# Patient Record
Sex: MALE | Race: BLACK OR AFRICAN AMERICAN | Employment: OTHER | URBAN - METROPOLITAN AREA
[De-identification: names, ages, dates, MRNs, and addresses within clinical notes are randomized per-mention and may not be internally consistent; named-entity substitution may affect disease eponyms.]

---

## 2017-01-30 ENCOUNTER — TELEPHONE (OUTPATIENT)
Dept: CARDIOLOGY CLINIC | Age: 77
End: 2017-01-30

## 2017-02-22 ENCOUNTER — TELEPHONE (OUTPATIENT)
Dept: CARDIOLOGY CLINIC | Age: 77
End: 2017-02-22

## 2017-03-16 ENCOUNTER — OFFICE VISIT (OUTPATIENT)
Dept: CARDIOLOGY CLINIC | Age: 77
End: 2017-03-16

## 2017-03-16 VITALS
BODY MASS INDEX: 28.47 KG/M2 | DIASTOLIC BLOOD PRESSURE: 96 MMHG | RESPIRATION RATE: 18 BRPM | HEIGHT: 74 IN | HEART RATE: 65 BPM | SYSTOLIC BLOOD PRESSURE: 142 MMHG | WEIGHT: 221.8 LBS

## 2017-03-16 DIAGNOSIS — I48.91 ATRIAL FIBRILLATION, UNSPECIFIED TYPE (HCC): Primary | ICD-10-CM

## 2017-03-16 PROCEDURE — G8427 DOCREV CUR MEDS BY ELIG CLIN: HCPCS | Performed by: INTERNAL MEDICINE

## 2017-03-16 PROCEDURE — G8484 FLU IMMUNIZE NO ADMIN: HCPCS | Performed by: INTERNAL MEDICINE

## 2017-03-16 PROCEDURE — 1036F TOBACCO NON-USER: CPT | Performed by: INTERNAL MEDICINE

## 2017-03-16 PROCEDURE — 1123F ACP DISCUSS/DSCN MKR DOCD: CPT | Performed by: INTERNAL MEDICINE

## 2017-03-16 PROCEDURE — 93000 ELECTROCARDIOGRAM COMPLETE: CPT | Performed by: INTERNAL MEDICINE

## 2017-03-16 PROCEDURE — G8420 CALC BMI NORM PARAMETERS: HCPCS | Performed by: INTERNAL MEDICINE

## 2017-03-16 PROCEDURE — 4040F PNEUMOC VAC/ADMIN/RCVD: CPT | Performed by: INTERNAL MEDICINE

## 2017-03-16 PROCEDURE — 99215 OFFICE O/P EST HI 40 MIN: CPT | Performed by: INTERNAL MEDICINE

## 2017-03-16 RX ORDER — AMLODIPINE BESYLATE 5 MG/1
5 TABLET ORAL DAILY
Qty: 30 TABLET | Refills: 3 | Status: SHIPPED | OUTPATIENT
Start: 2017-03-16 | End: 2017-03-16 | Stop reason: SDUPTHER

## 2017-03-17 RX ORDER — AMLODIPINE BESYLATE 5 MG/1
TABLET ORAL
Qty: 90 TABLET | Refills: 3 | Status: SHIPPED | OUTPATIENT
Start: 2017-03-17 | End: 2017-10-16 | Stop reason: SDUPTHER

## 2017-04-24 ENCOUNTER — TELEPHONE (OUTPATIENT)
Dept: CARDIOLOGY CLINIC | Age: 77
End: 2017-04-24

## 2017-07-31 ENCOUNTER — TELEPHONE (OUTPATIENT)
Dept: CARDIOLOGY CLINIC | Age: 77
End: 2017-07-31

## 2017-08-21 ENCOUNTER — TELEPHONE (OUTPATIENT)
Dept: CARDIOLOGY CLINIC | Age: 77
End: 2017-08-21

## 2017-08-21 DIAGNOSIS — R53.83 FATIGUE, UNSPECIFIED TYPE: ICD-10-CM

## 2017-08-21 DIAGNOSIS — R55 SYNCOPE, UNSPECIFIED SYNCOPE TYPE: Primary | ICD-10-CM

## 2017-08-21 DIAGNOSIS — R06.02 SHORTNESS OF BREATH: ICD-10-CM

## 2017-08-21 DIAGNOSIS — I10 ESSENTIAL HYPERTENSION: ICD-10-CM

## 2017-08-21 DIAGNOSIS — R06.09 DOE (DYSPNEA ON EXERTION): ICD-10-CM

## 2017-08-21 DIAGNOSIS — I48.19 PERSISTENT ATRIAL FIBRILLATION (HCC): ICD-10-CM

## 2017-09-19 ENCOUNTER — OFFICE VISIT (OUTPATIENT)
Dept: CARDIOLOGY CLINIC | Age: 77
End: 2017-09-19

## 2017-09-19 VITALS
SYSTOLIC BLOOD PRESSURE: 108 MMHG | WEIGHT: 206.6 LBS | DIASTOLIC BLOOD PRESSURE: 64 MMHG | BODY MASS INDEX: 26.53 KG/M2 | HEART RATE: 94 BPM

## 2017-09-19 DIAGNOSIS — I48.91 ATRIAL FIBRILLATION, UNSPECIFIED TYPE (HCC): Primary | ICD-10-CM

## 2017-09-19 DIAGNOSIS — I10 ESSENTIAL HYPERTENSION: ICD-10-CM

## 2017-09-19 PROCEDURE — 93000 ELECTROCARDIOGRAM COMPLETE: CPT | Performed by: INTERNAL MEDICINE

## 2017-09-19 PROCEDURE — 99215 OFFICE O/P EST HI 40 MIN: CPT | Performed by: INTERNAL MEDICINE

## 2017-09-19 RX ORDER — METOPROLOL SUCCINATE 200 MG/1
200 TABLET, EXTENDED RELEASE ORAL DAILY
Qty: 30 TABLET | Refills: 3 | Status: SHIPPED | OUTPATIENT
Start: 2017-09-19 | End: 2017-09-19 | Stop reason: SDUPTHER

## 2017-09-20 DIAGNOSIS — I10 ESSENTIAL HYPERTENSION: ICD-10-CM

## 2017-09-20 DIAGNOSIS — I48.91 ATRIAL FIBRILLATION, UNSPECIFIED TYPE (HCC): ICD-10-CM

## 2017-09-20 LAB
ANION GAP SERPL CALCULATED.3IONS-SCNC: 13 MMOL/L (ref 3–16)
BUN BLDV-MCNC: 12 MG/DL (ref 7–20)
CALCIUM SERPL-MCNC: 9.7 MG/DL (ref 8.3–10.6)
CHLORIDE BLD-SCNC: 102 MMOL/L (ref 99–110)
CO2: 27 MMOL/L (ref 21–32)
CREAT SERPL-MCNC: 1.3 MG/DL (ref 0.8–1.3)
GFR AFRICAN AMERICAN: >60
GFR NON-AFRICAN AMERICAN: 53
GLUCOSE BLD-MCNC: 102 MG/DL (ref 70–99)
POTASSIUM SERPL-SCNC: 4.1 MMOL/L (ref 3.5–5.1)
SODIUM BLD-SCNC: 142 MMOL/L (ref 136–145)

## 2017-09-22 ENCOUNTER — TELEPHONE (OUTPATIENT)
Dept: CARDIOLOGY CLINIC | Age: 77
End: 2017-09-22

## 2017-09-26 RX ORDER — METOPROLOL SUCCINATE 200 MG/1
TABLET, EXTENDED RELEASE ORAL
Qty: 90 TABLET | Refills: 3 | Status: SHIPPED | OUTPATIENT
Start: 2017-09-26 | End: 2019-03-22 | Stop reason: SDUPTHER

## 2017-10-16 RX ORDER — AMLODIPINE BESYLATE 5 MG/1
5 TABLET ORAL DAILY
Qty: 90 TABLET | Refills: 5 | Status: ON HOLD | OUTPATIENT
Start: 2017-10-16 | End: 2019-01-30 | Stop reason: HOSPADM

## 2017-11-07 ENCOUNTER — TELEPHONE (OUTPATIENT)
Dept: CARDIOLOGY CLINIC | Age: 77
End: 2017-11-07

## 2018-01-15 ENCOUNTER — TELEPHONE (OUTPATIENT)
Dept: CARDIOLOGY CLINIC | Age: 78
End: 2018-01-15

## 2018-02-14 ENCOUNTER — TELEPHONE (OUTPATIENT)
Dept: CARDIOLOGY CLINIC | Age: 78
End: 2018-02-14

## 2018-03-09 ENCOUNTER — TELEPHONE (OUTPATIENT)
Dept: CARDIOLOGY CLINIC | Age: 78
End: 2018-03-09

## 2018-04-23 ENCOUNTER — OFFICE VISIT (OUTPATIENT)
Dept: CARDIOLOGY CLINIC | Age: 78
End: 2018-04-23

## 2018-04-23 VITALS
SYSTOLIC BLOOD PRESSURE: 118 MMHG | DIASTOLIC BLOOD PRESSURE: 80 MMHG | BODY MASS INDEX: 27.58 KG/M2 | HEART RATE: 88 BPM | WEIGHT: 214.8 LBS

## 2018-04-23 DIAGNOSIS — I48.91 ATRIAL FIBRILLATION, UNSPECIFIED TYPE (HCC): Primary | ICD-10-CM

## 2018-04-23 DIAGNOSIS — R06.09 DOE (DYSPNEA ON EXERTION): ICD-10-CM

## 2018-04-23 DIAGNOSIS — I10 ESSENTIAL HYPERTENSION: ICD-10-CM

## 2018-04-23 PROCEDURE — 93000 ELECTROCARDIOGRAM COMPLETE: CPT | Performed by: INTERNAL MEDICINE

## 2018-04-23 PROCEDURE — 99214 OFFICE O/P EST MOD 30 MIN: CPT | Performed by: INTERNAL MEDICINE

## 2018-04-23 RX ORDER — HYDROCHLOROTHIAZIDE 25 MG/1
TABLET ORAL
Refills: 3 | Status: ON HOLD | COMMUNITY
Start: 2018-02-12 | End: 2019-01-30 | Stop reason: HOSPADM

## 2018-06-29 ENCOUNTER — TELEPHONE (OUTPATIENT)
Dept: CARDIOLOGY CLINIC | Age: 78
End: 2018-06-29

## 2018-06-29 DIAGNOSIS — I51.9 LV DYSFUNCTION: ICD-10-CM

## 2018-06-29 DIAGNOSIS — I48.91 ATRIAL FIBRILLATION BY ELECTROCARDIOGRAM (HCC): Primary | ICD-10-CM

## 2018-06-29 DIAGNOSIS — R06.02 SHORTNESS OF BREATH: ICD-10-CM

## 2018-09-12 NOTE — TELEPHONE ENCOUNTER
Medication: Eliquis     Strength: 5 mg     Directions:  Take one tablet by mouth twice daily     Last visit : 4/23/18    Next Visit : 10/22/18    Last fill: 6/7/17

## 2018-10-09 ENCOUNTER — ANTI-COAG VISIT (OUTPATIENT)
Dept: CARDIOLOGY CLINIC | Age: 78
End: 2018-10-09

## 2018-10-22 ENCOUNTER — TELEPHONE (OUTPATIENT)
Dept: CARDIOLOGY CLINIC | Age: 78
End: 2018-10-22

## 2018-10-22 ENCOUNTER — OFFICE VISIT (OUTPATIENT)
Dept: CARDIOLOGY CLINIC | Age: 78
End: 2018-10-22
Payer: MEDICARE

## 2018-10-22 VITALS
SYSTOLIC BLOOD PRESSURE: 122 MMHG | HEART RATE: 78 BPM | BODY MASS INDEX: 29.15 KG/M2 | DIASTOLIC BLOOD PRESSURE: 76 MMHG | WEIGHT: 227 LBS

## 2018-10-22 DIAGNOSIS — R06.09 DOE (DYSPNEA ON EXERTION): ICD-10-CM

## 2018-10-22 DIAGNOSIS — I10 ESSENTIAL HYPERTENSION: ICD-10-CM

## 2018-10-22 DIAGNOSIS — I48.20 CHRONIC ATRIAL FIBRILLATION (HCC): Primary | ICD-10-CM

## 2018-10-22 PROCEDURE — 99214 OFFICE O/P EST MOD 30 MIN: CPT | Performed by: INTERNAL MEDICINE

## 2018-11-05 ENCOUNTER — NURSE ONLY (OUTPATIENT)
Dept: CARDIOLOGY CLINIC | Age: 78
End: 2018-11-05

## 2018-11-05 NOTE — PROGRESS NOTES
Peer to Peer done with insurance company by Abdelrahman Menard for Stress test. Confirmation code is [id number].

## 2018-11-06 ENCOUNTER — HOSPITAL ENCOUNTER (OUTPATIENT)
Dept: NON INVASIVE DIAGNOSTICS | Age: 78
Discharge: HOME | End: 2018-11-06
Payer: MEDICARE

## 2018-11-06 ENCOUNTER — HOSPITAL ENCOUNTER (OUTPATIENT)
Dept: NUCLEAR MEDICINE | Age: 78
Discharge: HOME | End: 2018-11-06
Payer: MEDICARE

## 2018-11-06 DIAGNOSIS — R06.02 SHORTNESS OF BREATH: ICD-10-CM

## 2018-11-06 DIAGNOSIS — I48.91 ATRIAL FIBRILLATION BY ELECTROCARDIOGRAM (HCC): ICD-10-CM

## 2018-11-06 DIAGNOSIS — I51.9 LV DYSFUNCTION: ICD-10-CM

## 2018-11-06 LAB
LV EF: 65 %
LVEF MODALITY: NORMAL

## 2018-11-06 PROCEDURE — A9502 TC99M TETROFOSMIN: HCPCS | Performed by: NURSE PRACTITIONER

## 2018-11-06 PROCEDURE — 78452 HT MUSCLE IMAGE SPECT MULT: CPT

## 2018-11-06 PROCEDURE — 6360000002 HC RX W HCPCS: Performed by: NURSE PRACTITIONER

## 2018-11-06 PROCEDURE — 3430000000 HC RX DIAGNOSTIC RADIOPHARMACEUTICAL: Performed by: NURSE PRACTITIONER

## 2018-11-06 PROCEDURE — A9502 TC99M TETROFOSMIN: HCPCS | Performed by: INTERNAL MEDICINE

## 2018-11-06 PROCEDURE — 2580000003 HC RX 258: Performed by: NURSE PRACTITIONER

## 2018-11-06 PROCEDURE — 3430000000 HC RX DIAGNOSTIC RADIOPHARMACEUTICAL: Performed by: INTERNAL MEDICINE

## 2018-11-06 PROCEDURE — 93017 CV STRESS TEST TRACING ONLY: CPT

## 2018-11-06 RX ORDER — 0.9 % SODIUM CHLORIDE 0.9 %
10 VIAL (ML) INJECTION PRN
Status: DISCONTINUED | OUTPATIENT
Start: 2018-11-06 | End: 2018-11-07 | Stop reason: HOSPADM

## 2018-11-06 RX ADMIN — REGADENOSON 0.4 MG: 0.08 INJECTION, SOLUTION INTRAVENOUS at 11:33

## 2018-11-06 RX ADMIN — TETROFOSMIN 30 MILLICURIE: 1.38 INJECTION, POWDER, LYOPHILIZED, FOR SOLUTION INTRAVENOUS at 11:33

## 2018-11-06 RX ADMIN — SODIUM CHLORIDE 10 ML: 9 INJECTION, SOLUTION INTRAMUSCULAR; INTRAVENOUS; SUBCUTANEOUS at 09:51

## 2018-11-06 RX ADMIN — SODIUM CHLORIDE 10 ML: 9 INJECTION, SOLUTION INTRAMUSCULAR; INTRAVENOUS; SUBCUTANEOUS at 11:34

## 2018-11-06 RX ADMIN — TETROFOSMIN 10 MILLICURIE: 1.38 INJECTION, POWDER, LYOPHILIZED, FOR SOLUTION INTRAVENOUS at 09:50

## 2018-11-07 ENCOUNTER — NURSE ONLY (OUTPATIENT)
Dept: CARDIOLOGY CLINIC | Age: 78
End: 2018-11-07

## 2018-11-07 DIAGNOSIS — R94.39 ABNORMAL STRESS TEST: Primary | ICD-10-CM

## 2018-11-08 ENCOUNTER — TELEPHONE (OUTPATIENT)
Dept: CARDIOLOGY CLINIC | Age: 78
End: 2018-11-08

## 2018-11-08 DIAGNOSIS — E78.00 PURE HYPERCHOLESTEROLEMIA: Primary | ICD-10-CM

## 2018-11-12 NOTE — TELEPHONE ENCOUNTER
Called and spoke with Mr. Rossi ,   He denies cp or fatigue. He has SOB with exertion (long distances) which he states is chronic and unchanged. He is on maximal doses of metoprolol and lisinopril. He takes eliquis (not on aspirin). His cholesterol hasn't been checked recently and his lipitor is 10 mg. I will mail an order for Lipid panel. I discussed coronary angiogram again and he still doesn't want it. He will reconsider if he has symptoms.      Ignacio Middleton NP

## 2019-01-11 ENCOUNTER — TELEPHONE (OUTPATIENT)
Dept: CARDIOLOGY CLINIC | Age: 79
End: 2019-01-11

## 2019-01-11 NOTE — TELEPHONE ENCOUNTER
Patient would like to discuss angiogram pt was scheduled and didn't make it to the appointment  Pt wants  to see if he still needs to have the test done since he had the stress test 11/13/18

## 2019-01-15 ENCOUNTER — TELEPHONE (OUTPATIENT)
Dept: CARDIOLOGY CLINIC | Age: 79
End: 2019-01-15

## 2019-01-16 ENCOUNTER — NURSE ONLY (OUTPATIENT)
Dept: CARDIOLOGY CLINIC | Age: 79
End: 2019-01-16

## 2019-01-29 ENCOUNTER — HOSPITAL ENCOUNTER (OUTPATIENT)
Dept: CARDIAC CATH/INVASIVE PROCEDURES | Age: 79
Setting detail: OBSERVATION
Discharge: HOME | End: 2019-01-30
Attending: INTERNAL MEDICINE | Admitting: INTERNAL MEDICINE
Payer: MEDICARE

## 2019-01-29 PROBLEM — I25.10 CAD IN NATIVE ARTERY: Status: ACTIVE | Noted: 2019-01-29

## 2019-01-29 LAB
A/G RATIO: 1 (ref 1.1–2.2)
ALBUMIN SERPL-MCNC: 3.8 G/DL (ref 3.4–5)
ALP BLD-CCNC: 80 U/L (ref 40–129)
ALT SERPL-CCNC: 10 U/L (ref 10–40)
ANION GAP SERPL CALCULATED.3IONS-SCNC: 12 MMOL/L (ref 3–16)
AST SERPL-CCNC: 17 U/L (ref 15–37)
BILIRUB SERPL-MCNC: 0.5 MG/DL (ref 0–1)
BUN BLDV-MCNC: 18 MG/DL (ref 7–20)
CALCIUM SERPL-MCNC: 9.1 MG/DL (ref 8.3–10.6)
CHLORIDE BLD-SCNC: 103 MMOL/L (ref 99–110)
CO2: 26 MMOL/L (ref 21–32)
CREAT SERPL-MCNC: 1.4 MG/DL (ref 0.8–1.3)
GFR AFRICAN AMERICAN: 59
GFR NON-AFRICAN AMERICAN: 49
GLOBULIN: 3.8 G/DL
GLUCOSE BLD-MCNC: 117 MG/DL (ref 70–99)
HCT VFR BLD CALC: 37.7 % (ref 40.5–52.5)
HEMOGLOBIN: 12.3 G/DL (ref 13.5–17.5)
INR BLD: 1.15 (ref 0.86–1.14)
MCH RBC QN AUTO: 29.9 PG (ref 26–34)
MCHC RBC AUTO-ENTMCNC: 32.7 G/DL (ref 31–36)
MCV RBC AUTO: 91.5 FL (ref 80–100)
PDW BLD-RTO: 13.9 % (ref 12.4–15.4)
PLATELET # BLD: 231 K/UL (ref 135–450)
PMV BLD AUTO: 8.9 FL (ref 5–10.5)
POC ACT LR: 361 SEC (ref 113–149)
POC ACT LR: >400 SEC (ref 113–149)
POTASSIUM SERPL-SCNC: 3.5 MMOL/L (ref 3.5–5.1)
PROTHROMBIN TIME: 13.1 SEC (ref 9.8–13)
RBC # BLD: 4.12 M/UL (ref 4.2–5.9)
SODIUM BLD-SCNC: 141 MMOL/L (ref 136–145)
TOTAL PROTEIN: 7.6 G/DL (ref 6.4–8.2)
WBC # BLD: 5.6 K/UL (ref 4–11)

## 2019-01-29 PROCEDURE — 2709999900 HC NON-CHARGEABLE SUPPLY

## 2019-01-29 PROCEDURE — C1874 STENT, COATED/COV W/DEL SYS: HCPCS

## 2019-01-29 PROCEDURE — 85610 PROTHROMBIN TIME: CPT

## 2019-01-29 PROCEDURE — 2500000003 HC RX 250 WO HCPCS

## 2019-01-29 PROCEDURE — 85027 COMPLETE CBC AUTOMATED: CPT

## 2019-01-29 PROCEDURE — 99152 MOD SED SAME PHYS/QHP 5/>YRS: CPT | Performed by: INTERNAL MEDICINE

## 2019-01-29 PROCEDURE — 92928 PRQ TCAT PLMT NTRAC ST 1 LES: CPT | Performed by: INTERNAL MEDICINE

## 2019-01-29 PROCEDURE — 92928 PRQ TCAT PLMT NTRAC ST 1 LES: CPT

## 2019-01-29 PROCEDURE — 36556 INSERT NON-TUNNEL CV CATH: CPT | Performed by: INTERNAL MEDICINE

## 2019-01-29 PROCEDURE — C1769 GUIDE WIRE: HCPCS

## 2019-01-29 PROCEDURE — C1894 INTRO/SHEATH, NON-LASER: HCPCS

## 2019-01-29 PROCEDURE — 93458 L HRT ARTERY/VENTRICLE ANGIO: CPT | Performed by: INTERNAL MEDICINE

## 2019-01-29 PROCEDURE — 93308 TTE F-UP OR LMTD: CPT

## 2019-01-29 PROCEDURE — 99152 MOD SED SAME PHYS/QHP 5/>YRS: CPT

## 2019-01-29 PROCEDURE — APPSS30 APP SPLIT SHARED TIME 16-30 MINUTES: Performed by: NURSE PRACTITIONER

## 2019-01-29 PROCEDURE — C1887 CATHETER, GUIDING: HCPCS

## 2019-01-29 PROCEDURE — 93458 L HRT ARTERY/VENTRICLE ANGIO: CPT

## 2019-01-29 PROCEDURE — 85347 COAGULATION TIME ACTIVATED: CPT

## 2019-01-29 PROCEDURE — 93005 ELECTROCARDIOGRAM TRACING: CPT | Performed by: INTERNAL MEDICINE

## 2019-01-29 PROCEDURE — 2580000003 HC RX 258: Performed by: INTERNAL MEDICINE

## 2019-01-29 PROCEDURE — 80053 COMPREHEN METABOLIC PANEL: CPT

## 2019-01-29 PROCEDURE — 6360000004 HC RX CONTRAST MEDICATION: Performed by: INTERNAL MEDICINE

## 2019-01-29 PROCEDURE — 99153 MOD SED SAME PHYS/QHP EA: CPT

## 2019-01-29 PROCEDURE — G0378 HOSPITAL OBSERVATION PER HR: HCPCS

## 2019-01-29 RX ORDER — ASPIRIN 325 MG
325 TABLET ORAL ONCE
Status: DISCONTINUED | OUTPATIENT
Start: 2019-01-29 | End: 2019-01-29

## 2019-01-29 RX ORDER — SODIUM CHLORIDE 9 MG/ML
INJECTION, SOLUTION INTRAVENOUS CONTINUOUS
Status: DISCONTINUED | OUTPATIENT
Start: 2019-01-29 | End: 2019-01-30 | Stop reason: HOSPADM

## 2019-01-29 RX ORDER — SODIUM CHLORIDE 0.9 % (FLUSH) 0.9 %
10 SYRINGE (ML) INJECTION EVERY 12 HOURS SCHEDULED
Status: DISCONTINUED | OUTPATIENT
Start: 2019-01-29 | End: 2019-01-29 | Stop reason: SDUPTHER

## 2019-01-29 RX ORDER — ATORVASTATIN CALCIUM 40 MG/1
40 TABLET, FILM COATED ORAL DAILY
Status: DISCONTINUED | OUTPATIENT
Start: 2019-01-30 | End: 2019-01-30 | Stop reason: HOSPADM

## 2019-01-29 RX ORDER — METOPROLOL SUCCINATE 100 MG/1
100 TABLET, EXTENDED RELEASE ORAL DAILY
Status: DISCONTINUED | OUTPATIENT
Start: 2019-01-30 | End: 2019-01-30 | Stop reason: HOSPADM

## 2019-01-29 RX ORDER — SODIUM CHLORIDE 0.9 % (FLUSH) 0.9 %
10 SYRINGE (ML) INJECTION EVERY 12 HOURS SCHEDULED
Status: DISCONTINUED | OUTPATIENT
Start: 2019-01-29 | End: 2019-01-30 | Stop reason: HOSPADM

## 2019-01-29 RX ORDER — ASPIRIN 81 MG/1
81 TABLET, CHEWABLE ORAL DAILY
Status: DISCONTINUED | OUTPATIENT
Start: 2019-01-30 | End: 2019-01-30 | Stop reason: HOSPADM

## 2019-01-29 RX ORDER — SODIUM CHLORIDE 0.9 % (FLUSH) 0.9 %
10 SYRINGE (ML) INJECTION PRN
Status: DISCONTINUED | OUTPATIENT
Start: 2019-01-29 | End: 2019-01-29 | Stop reason: SDUPTHER

## 2019-01-29 RX ORDER — ACETAMINOPHEN 325 MG/1
650 TABLET ORAL EVERY 4 HOURS PRN
Status: DISCONTINUED | OUTPATIENT
Start: 2019-01-29 | End: 2019-01-30 | Stop reason: HOSPADM

## 2019-01-29 RX ORDER — SODIUM CHLORIDE 0.9 % (FLUSH) 0.9 %
10 SYRINGE (ML) INJECTION PRN
Status: DISCONTINUED | OUTPATIENT
Start: 2019-01-29 | End: 2019-01-30 | Stop reason: HOSPADM

## 2019-01-29 RX ORDER — CLOPIDOGREL BISULFATE 75 MG/1
75 TABLET ORAL DAILY
Status: DISCONTINUED | OUTPATIENT
Start: 2019-01-30 | End: 2019-01-30 | Stop reason: HOSPADM

## 2019-01-29 RX ADMIN — IOHEXOL 195 ML: 350 INJECTION, SOLUTION INTRAVENOUS at 11:32

## 2019-01-29 RX ADMIN — Medication 10 ML: at 20:06

## 2019-01-29 RX ADMIN — SODIUM CHLORIDE: 9 INJECTION, SOLUTION INTRAVENOUS at 20:04

## 2019-01-29 ASSESSMENT — PAIN SCALES - GENERAL: PAINLEVEL_OUTOF10: 0

## 2019-01-29 ASSESSMENT — PAIN - FUNCTIONAL ASSESSMENT: PAIN_FUNCTIONAL_ASSESSMENT: 0-10

## 2019-01-30 VITALS
SYSTOLIC BLOOD PRESSURE: 131 MMHG | WEIGHT: 220 LBS | HEIGHT: 74 IN | TEMPERATURE: 97.6 F | HEART RATE: 85 BPM | OXYGEN SATURATION: 98 % | DIASTOLIC BLOOD PRESSURE: 82 MMHG | BODY MASS INDEX: 28.23 KG/M2 | RESPIRATION RATE: 18 BRPM

## 2019-01-30 LAB
ANION GAP SERPL CALCULATED.3IONS-SCNC: 9 MMOL/L (ref 3–16)
BUN BLDV-MCNC: 14 MG/DL (ref 7–20)
CALCIUM SERPL-MCNC: 8.6 MG/DL (ref 8.3–10.6)
CHLORIDE BLD-SCNC: 105 MMOL/L (ref 99–110)
CHOLESTEROL, TOTAL: 125 MG/DL (ref 0–199)
CO2: 26 MMOL/L (ref 21–32)
CREAT SERPL-MCNC: 1.2 MG/DL (ref 0.8–1.3)
EKG ATRIAL RATE: 208 BPM
EKG ATRIAL RATE: 394 BPM
EKG DIAGNOSIS: NORMAL
EKG DIAGNOSIS: NORMAL
EKG Q-T INTERVAL: 360 MS
EKG Q-T INTERVAL: 370 MS
EKG QRS DURATION: 80 MS
EKG QRS DURATION: 82 MS
EKG QTC CALCULATION (BAZETT): 447 MS
EKG QTC CALCULATION (BAZETT): 447 MS
EKG R AXIS: 23 DEGREES
EKG R AXIS: 32 DEGREES
EKG T AXIS: 20 DEGREES
EKG T AXIS: 32 DEGREES
EKG VENTRICULAR RATE: 88 BPM
EKG VENTRICULAR RATE: 93 BPM
GFR AFRICAN AMERICAN: >60
GFR NON-AFRICAN AMERICAN: 58
GLUCOSE BLD-MCNC: 126 MG/DL (ref 70–99)
HCT VFR BLD CALC: 34.7 % (ref 40.5–52.5)
HDLC SERPL-MCNC: 41 MG/DL (ref 40–60)
HEMOGLOBIN: 11.2 G/DL (ref 13.5–17.5)
LDL CHOLESTEROL CALCULATED: 73 MG/DL
MAGNESIUM: 2 MG/DL (ref 1.8–2.4)
MCH RBC QN AUTO: 30 PG (ref 26–34)
MCHC RBC AUTO-ENTMCNC: 32.3 G/DL (ref 31–36)
MCV RBC AUTO: 92.8 FL (ref 80–100)
PDW BLD-RTO: 14 % (ref 12.4–15.4)
PLATELET # BLD: 197 K/UL (ref 135–450)
PMV BLD AUTO: 9.2 FL (ref 5–10.5)
POTASSIUM SERPL-SCNC: 4 MMOL/L (ref 3.5–5.1)
RBC # BLD: 3.74 M/UL (ref 4.2–5.9)
SODIUM BLD-SCNC: 140 MMOL/L (ref 136–145)
TRIGL SERPL-MCNC: 54 MG/DL (ref 0–150)
VLDLC SERPL CALC-MCNC: 11 MG/DL
WBC # BLD: 6.5 K/UL (ref 4–11)

## 2019-01-30 PROCEDURE — 93010 ELECTROCARDIOGRAM REPORT: CPT | Performed by: INTERNAL MEDICINE

## 2019-01-30 PROCEDURE — 6370000000 HC RX 637 (ALT 250 FOR IP): Performed by: NURSE PRACTITIONER

## 2019-01-30 PROCEDURE — 93005 ELECTROCARDIOGRAM TRACING: CPT | Performed by: NURSE PRACTITIONER

## 2019-01-30 PROCEDURE — G0378 HOSPITAL OBSERVATION PER HR: HCPCS

## 2019-01-30 PROCEDURE — 6370000000 HC RX 637 (ALT 250 FOR IP): Performed by: INTERNAL MEDICINE

## 2019-01-30 PROCEDURE — 83735 ASSAY OF MAGNESIUM: CPT

## 2019-01-30 PROCEDURE — 80061 LIPID PANEL: CPT

## 2019-01-30 PROCEDURE — 80048 BASIC METABOLIC PNL TOTAL CA: CPT

## 2019-01-30 PROCEDURE — G0238 OTH RESP PROC, INDIV: HCPCS

## 2019-01-30 PROCEDURE — 99217 PR OBSERVATION CARE DISCHARGE MANAGEMENT: CPT | Performed by: NURSE PRACTITIONER

## 2019-01-30 PROCEDURE — 85027 COMPLETE CBC AUTOMATED: CPT

## 2019-01-30 RX ORDER — PANTOPRAZOLE SODIUM 40 MG/1
40 TABLET, DELAYED RELEASE ORAL
Status: DISCONTINUED | OUTPATIENT
Start: 2019-01-30 | End: 2019-01-30 | Stop reason: HOSPADM

## 2019-01-30 RX ORDER — LISINOPRIL 10 MG/1
10 TABLET ORAL DAILY
Status: DISCONTINUED | OUTPATIENT
Start: 2019-01-30 | End: 2019-01-30 | Stop reason: HOSPADM

## 2019-01-30 RX ORDER — ATORVASTATIN CALCIUM 40 MG/1
40 TABLET, FILM COATED ORAL DAILY
Qty: 30 TABLET | Refills: 0 | Status: SHIPPED | OUTPATIENT
Start: 2019-01-31 | End: 2019-03-11 | Stop reason: SDUPTHER

## 2019-01-30 RX ORDER — LISINOPRIL 10 MG/1
10 TABLET ORAL DAILY
Qty: 30 TABLET | Refills: 0 | Status: SHIPPED | OUTPATIENT
Start: 2019-01-30 | End: 2019-02-04 | Stop reason: SDUPTHER

## 2019-01-30 RX ORDER — METOPROLOL SUCCINATE 100 MG/1
100 TABLET, EXTENDED RELEASE ORAL ONCE
Status: COMPLETED | OUTPATIENT
Start: 2019-01-30 | End: 2019-01-30

## 2019-01-30 RX ORDER — CLOPIDOGREL BISULFATE 75 MG/1
75 TABLET ORAL DAILY
Qty: 30 TABLET | Refills: 0 | Status: SHIPPED | OUTPATIENT
Start: 2019-01-31 | End: 2019-03-04

## 2019-01-30 RX ORDER — PANTOPRAZOLE SODIUM 40 MG/1
40 TABLET, DELAYED RELEASE ORAL
Qty: 30 TABLET | Refills: 0 | Status: SHIPPED | OUTPATIENT
Start: 2019-01-30 | End: 2019-03-11 | Stop reason: SDUPTHER

## 2019-01-30 RX ORDER — ASPIRIN 81 MG/1
81 TABLET, CHEWABLE ORAL DAILY
Qty: 30 TABLET | Refills: 0 | Status: SHIPPED | OUTPATIENT
Start: 2019-01-31 | End: 2019-03-11 | Stop reason: SDUPTHER

## 2019-01-30 RX ADMIN — CLOPIDOGREL BISULFATE 75 MG: 75 TABLET ORAL at 08:47

## 2019-01-30 RX ADMIN — LISINOPRIL 10 MG: 10 TABLET ORAL at 10:29

## 2019-01-30 RX ADMIN — ATORVASTATIN CALCIUM 40 MG: 40 TABLET, FILM COATED ORAL at 08:47

## 2019-01-30 RX ADMIN — METOPROLOL SUCCINATE 100 MG: 100 TABLET, EXTENDED RELEASE ORAL at 08:47

## 2019-01-30 RX ADMIN — ASPIRIN 81 MG 81 MG: 81 TABLET ORAL at 08:47

## 2019-01-30 RX ADMIN — METOPROLOL SUCCINATE 100 MG: 100 TABLET, EXTENDED RELEASE ORAL at 10:29

## 2019-01-30 ASSESSMENT — PAIN SCALES - GENERAL
PAINLEVEL_OUTOF10: 0
PAINLEVEL_OUTOF10: 0

## 2019-01-31 LAB
POC ACT LR: 186 SEC (ref 113–149)
POC ACT LR: 186 SEC (ref 113–149)
POC ACT LR: 217 SEC (ref 113–149)
POC ACT LR: 256 SEC (ref 113–149)

## 2019-02-04 ENCOUNTER — OFFICE VISIT (OUTPATIENT)
Dept: CARDIOLOGY CLINIC | Age: 79
End: 2019-02-04
Payer: MEDICARE

## 2019-02-04 VITALS
DIASTOLIC BLOOD PRESSURE: 90 MMHG | HEART RATE: 86 BPM | SYSTOLIC BLOOD PRESSURE: 138 MMHG | WEIGHT: 228.2 LBS | BODY MASS INDEX: 29.3 KG/M2

## 2019-02-04 DIAGNOSIS — E78.00 PURE HYPERCHOLESTEROLEMIA: ICD-10-CM

## 2019-02-04 DIAGNOSIS — I48.19 PERSISTENT ATRIAL FIBRILLATION (HCC): ICD-10-CM

## 2019-02-04 DIAGNOSIS — I25.10 CORONARY ARTERY DISEASE INVOLVING NATIVE CORONARY ARTERY OF NATIVE HEART WITHOUT ANGINA PECTORIS: Primary | ICD-10-CM

## 2019-02-04 DIAGNOSIS — I10 ESSENTIAL HYPERTENSION: ICD-10-CM

## 2019-02-04 PROCEDURE — 99214 OFFICE O/P EST MOD 30 MIN: CPT | Performed by: NURSE PRACTITIONER

## 2019-02-04 RX ORDER — LISINOPRIL 20 MG/1
20 TABLET ORAL DAILY
Qty: 90 TABLET | Refills: 3
Start: 2019-02-04 | End: 2019-02-19 | Stop reason: SDUPTHER

## 2019-02-08 ENCOUNTER — HOSPITAL ENCOUNTER (INPATIENT)
Age: 79
LOS: 4 days | Discharge: HOME | DRG: 813 | End: 2019-02-12
Attending: EMERGENCY MEDICINE | Admitting: INTERNAL MEDICINE
Payer: MEDICARE

## 2019-02-08 ENCOUNTER — APPOINTMENT (OUTPATIENT)
Dept: GENERAL RADIOLOGY | Age: 79
DRG: 813 | End: 2019-02-08
Payer: MEDICARE

## 2019-02-08 DIAGNOSIS — E55.9 VITAMIN D DEFICIENCY: ICD-10-CM

## 2019-02-08 DIAGNOSIS — K92.1 GASTROINTESTINAL HEMORRHAGE WITH MELENA: Primary | ICD-10-CM

## 2019-02-08 PROBLEM — K92.2 UPPER GI BLEED: Status: ACTIVE | Noted: 2019-02-08

## 2019-02-08 LAB
ABO/RH: NORMAL
ALBUMIN SERPL-MCNC: 3.4 G/DL (ref 3.4–5)
ANION GAP SERPL CALCULATED.3IONS-SCNC: 10 MMOL/L (ref 3–16)
ANTIBODY SCREEN: NORMAL
BASOPHILS ABSOLUTE: 0 K/UL (ref 0–0.2)
BASOPHILS RELATIVE PERCENT: 0.1 %
BLOOD BANK DISPENSE STATUS: NORMAL
BLOOD BANK DISPENSE STATUS: NORMAL
BLOOD BANK PRODUCT CODE: NORMAL
BLOOD BANK PRODUCT CODE: NORMAL
BPU ID: NORMAL
BPU ID: NORMAL
BUN BLDV-MCNC: 37 MG/DL (ref 7–20)
CALCIUM SERPL-MCNC: 8.4 MG/DL (ref 8.3–10.6)
CHLORIDE BLD-SCNC: 112 MMOL/L (ref 99–110)
CO2: 22 MMOL/L (ref 21–32)
CREAT SERPL-MCNC: 1.1 MG/DL (ref 0.8–1.3)
DESCRIPTION BLOOD BANK: NORMAL
DESCRIPTION BLOOD BANK: NORMAL
EKG ATRIAL RATE: 94 BPM
EKG DIAGNOSIS: NORMAL
EKG Q-T INTERVAL: 392 MS
EKG QRS DURATION: 82 MS
EKG QTC CALCULATION (BAZETT): 487 MS
EKG R AXIS: 46 DEGREES
EKG T AXIS: 42 DEGREES
EKG VENTRICULAR RATE: 93 BPM
EOSINOPHILS ABSOLUTE: 0 K/UL (ref 0–0.6)
EOSINOPHILS RELATIVE PERCENT: 0.2 %
GFR AFRICAN AMERICAN: >60
GFR NON-AFRICAN AMERICAN: >60
GLUCOSE BLD-MCNC: 111 MG/DL (ref 70–99)
HCT VFR BLD CALC: 20.5 % (ref 40.5–52.5)
HCT VFR BLD CALC: 22.2 % (ref 40.5–52.5)
HEMOGLOBIN: 6.7 G/DL (ref 13.5–17.5)
HEMOGLOBIN: 7.2 G/DL (ref 13.5–17.5)
INR BLD: 1.39 (ref 0.86–1.14)
LYMPHOCYTES ABSOLUTE: 0.4 K/UL (ref 1–5.1)
LYMPHOCYTES RELATIVE PERCENT: 3.9 %
MAGNESIUM: 2 MG/DL (ref 1.8–2.4)
MCH RBC QN AUTO: 30.8 PG (ref 26–34)
MCHC RBC AUTO-ENTMCNC: 32.6 G/DL (ref 31–36)
MCV RBC AUTO: 94.4 FL (ref 80–100)
MONOCYTES ABSOLUTE: 0.5 K/UL (ref 0–1.3)
MONOCYTES RELATIVE PERCENT: 4.9 %
NEUTROPHILS ABSOLUTE: 9.5 K/UL (ref 1.7–7.7)
NEUTROPHILS RELATIVE PERCENT: 90.9 %
PDW BLD-RTO: 14.3 % (ref 12.4–15.4)
PHOSPHORUS: 2 MG/DL (ref 2.5–4.9)
PLATELET # BLD: 188 K/UL (ref 135–450)
PMV BLD AUTO: 9 FL (ref 5–10.5)
POC OCCULT BLOOD STOOL: POSITIVE
POTASSIUM SERPL-SCNC: 4.2 MMOL/L (ref 3.5–5.1)
PROTHROMBIN TIME: 15.8 SEC (ref 9.8–13)
RBC # BLD: 2.18 M/UL (ref 4.2–5.9)
SODIUM BLD-SCNC: 144 MMOL/L (ref 136–145)
TROPONIN: <0.01 NG/ML
WBC # BLD: 10.4 K/UL (ref 4–11)

## 2019-02-08 PROCEDURE — 2580000003 HC RX 258: Performed by: EMERGENCY MEDICINE

## 2019-02-08 PROCEDURE — 71046 X-RAY EXAM CHEST 2 VIEWS: CPT

## 2019-02-08 PROCEDURE — 99223 1ST HOSP IP/OBS HIGH 75: CPT | Performed by: INTERNAL MEDICINE

## 2019-02-08 PROCEDURE — 85018 HEMOGLOBIN: CPT

## 2019-02-08 PROCEDURE — 85014 HEMATOCRIT: CPT

## 2019-02-08 PROCEDURE — 36415 COLL VENOUS BLD VENIPUNCTURE: CPT

## 2019-02-08 PROCEDURE — 80069 RENAL FUNCTION PANEL: CPT

## 2019-02-08 PROCEDURE — 2580000003 HC RX 258: Performed by: STUDENT IN AN ORGANIZED HEALTH CARE EDUCATION/TRAINING PROGRAM

## 2019-02-08 PROCEDURE — 86901 BLOOD TYPING SEROLOGIC RH(D): CPT

## 2019-02-08 PROCEDURE — 84484 ASSAY OF TROPONIN QUANT: CPT

## 2019-02-08 PROCEDURE — 85025 COMPLETE CBC W/AUTO DIFF WBC: CPT

## 2019-02-08 PROCEDURE — 6360000002 HC RX W HCPCS: Performed by: STUDENT IN AN ORGANIZED HEALTH CARE EDUCATION/TRAINING PROGRAM

## 2019-02-08 PROCEDURE — 94761 N-INVAS EAR/PLS OXIMETRY MLT: CPT

## 2019-02-08 PROCEDURE — 93005 ELECTROCARDIOGRAM TRACING: CPT | Performed by: EMERGENCY MEDICINE

## 2019-02-08 PROCEDURE — C9113 INJ PANTOPRAZOLE SODIUM, VIA: HCPCS | Performed by: STUDENT IN AN ORGANIZED HEALTH CARE EDUCATION/TRAINING PROGRAM

## 2019-02-08 PROCEDURE — 6360000002 HC RX W HCPCS: Performed by: EMERGENCY MEDICINE

## 2019-02-08 PROCEDURE — 96361 HYDRATE IV INFUSION ADD-ON: CPT

## 2019-02-08 PROCEDURE — P9016 RBC LEUKOCYTES REDUCED: HCPCS

## 2019-02-08 PROCEDURE — 82272 OCCULT BLD FECES 1-3 TESTS: CPT

## 2019-02-08 PROCEDURE — 86923 COMPATIBILITY TEST ELECTRIC: CPT

## 2019-02-08 PROCEDURE — C9113 INJ PANTOPRAZOLE SODIUM, VIA: HCPCS | Performed by: EMERGENCY MEDICINE

## 2019-02-08 PROCEDURE — 86850 RBC ANTIBODY SCREEN: CPT

## 2019-02-08 PROCEDURE — 99291 CRITICAL CARE FIRST HOUR: CPT | Performed by: INTERNAL MEDICINE

## 2019-02-08 PROCEDURE — 83735 ASSAY OF MAGNESIUM: CPT

## 2019-02-08 PROCEDURE — 85610 PROTHROMBIN TIME: CPT

## 2019-02-08 PROCEDURE — 6370000000 HC RX 637 (ALT 250 FOR IP): Performed by: STUDENT IN AN ORGANIZED HEALTH CARE EDUCATION/TRAINING PROGRAM

## 2019-02-08 PROCEDURE — 99285 EMERGENCY DEPT VISIT HI MDM: CPT

## 2019-02-08 PROCEDURE — 96374 THER/PROPH/DIAG INJ IV PUSH: CPT

## 2019-02-08 PROCEDURE — 36430 TRANSFUSION BLD/BLD COMPNT: CPT

## 2019-02-08 PROCEDURE — 86900 BLOOD TYPING SEROLOGIC ABO: CPT

## 2019-02-08 PROCEDURE — 2000000000 HC ICU R&B

## 2019-02-08 RX ORDER — ATORVASTATIN CALCIUM 40 MG/1
40 TABLET, FILM COATED ORAL DAILY
Status: DISCONTINUED | OUTPATIENT
Start: 2019-02-08 | End: 2019-02-12 | Stop reason: HOSPADM

## 2019-02-08 RX ORDER — SODIUM CHLORIDE 0.9 % (FLUSH) 0.9 %
10 SYRINGE (ML) INJECTION PRN
Status: DISCONTINUED | OUTPATIENT
Start: 2019-02-08 | End: 2019-02-12 | Stop reason: HOSPADM

## 2019-02-08 RX ORDER — ACETAMINOPHEN 325 MG/1
650 TABLET ORAL EVERY 4 HOURS PRN
Status: DISCONTINUED | OUTPATIENT
Start: 2019-02-08 | End: 2019-02-12 | Stop reason: HOSPADM

## 2019-02-08 RX ORDER — 0.9 % SODIUM CHLORIDE 0.9 %
250 INTRAVENOUS SOLUTION INTRAVENOUS ONCE
Status: COMPLETED | OUTPATIENT
Start: 2019-02-08 | End: 2019-02-09

## 2019-02-08 RX ORDER — 0.9 % SODIUM CHLORIDE 0.9 %
1000 INTRAVENOUS SOLUTION INTRAVENOUS ONCE
Status: COMPLETED | OUTPATIENT
Start: 2019-02-08 | End: 2019-02-08

## 2019-02-08 RX ORDER — SODIUM CHLORIDE 9 MG/ML
INJECTION, SOLUTION INTRAVENOUS CONTINUOUS
Status: DISCONTINUED | OUTPATIENT
Start: 2019-02-08 | End: 2019-02-09

## 2019-02-08 RX ORDER — CLOPIDOGREL BISULFATE 75 MG/1
75 TABLET ORAL DAILY
Status: DISCONTINUED | OUTPATIENT
Start: 2019-02-08 | End: 2019-02-08

## 2019-02-08 RX ORDER — ONDANSETRON 2 MG/ML
4 INJECTION INTRAMUSCULAR; INTRAVENOUS EVERY 6 HOURS PRN
Status: DISCONTINUED | OUTPATIENT
Start: 2019-02-08 | End: 2019-02-12 | Stop reason: HOSPADM

## 2019-02-08 RX ORDER — SODIUM CHLORIDE 0.9 % (FLUSH) 0.9 %
10 SYRINGE (ML) INJECTION EVERY 12 HOURS SCHEDULED
Status: DISCONTINUED | OUTPATIENT
Start: 2019-02-08 | End: 2019-02-12 | Stop reason: HOSPADM

## 2019-02-08 RX ORDER — PANTOPRAZOLE SODIUM 40 MG/10ML
80 INJECTION, POWDER, LYOPHILIZED, FOR SOLUTION INTRAVENOUS ONCE
Status: COMPLETED | OUTPATIENT
Start: 2019-02-08 | End: 2019-02-08

## 2019-02-08 RX ORDER — ASPIRIN 81 MG/1
81 TABLET, CHEWABLE ORAL DAILY
Status: DISCONTINUED | OUTPATIENT
Start: 2019-02-08 | End: 2019-02-08

## 2019-02-08 RX ADMIN — ATORVASTATIN CALCIUM 40 MG: 40 TABLET, FILM COATED ORAL at 18:51

## 2019-02-08 RX ADMIN — SODIUM CHLORIDE 250 ML: 9 INJECTION, SOLUTION INTRAVENOUS at 14:57

## 2019-02-08 RX ADMIN — SODIUM CHLORIDE: 9 INJECTION, SOLUTION INTRAVENOUS at 18:50

## 2019-02-08 RX ADMIN — SODIUM CHLORIDE 250 ML: 9 INJECTION, SOLUTION INTRAVENOUS at 18:51

## 2019-02-08 RX ADMIN — Medication 10 ML: at 22:04

## 2019-02-08 RX ADMIN — SODIUM CHLORIDE 8 MG/HR: 900 INJECTION, SOLUTION INTRAVENOUS at 18:50

## 2019-02-08 RX ADMIN — SODIUM CHLORIDE 1000 ML: 9 INJECTION, SOLUTION INTRAVENOUS at 13:24

## 2019-02-08 RX ADMIN — PANTOPRAZOLE SODIUM 80 MG: 40 INJECTION, POWDER, FOR SOLUTION INTRAVENOUS at 14:57

## 2019-02-08 ASSESSMENT — ENCOUNTER SYMPTOMS
SHORTNESS OF BREATH: 0
RHINORRHEA: 0
ABDOMINAL DISTENTION: 0
WHEEZING: 0
DIARRHEA: 0
VOMITING: 0
ABDOMINAL PAIN: 0
SORE THROAT: 0
CHOKING: 0
VOICE CHANGE: 0
CHEST TIGHTNESS: 0
NAUSEA: 1
CONSTIPATION: 1
STRIDOR: 0
RECTAL PAIN: 0
COUGH: 0
SINUS PAIN: 0

## 2019-02-08 ASSESSMENT — PAIN SCALES - GENERAL
PAINLEVEL_OUTOF10: 0

## 2019-02-09 ENCOUNTER — APPOINTMENT (OUTPATIENT)
Dept: GENERAL RADIOLOGY | Age: 79
DRG: 813 | End: 2019-02-09
Payer: MEDICARE

## 2019-02-09 LAB
ANION GAP SERPL CALCULATED.3IONS-SCNC: 10 MMOL/L (ref 3–16)
BASOPHILS ABSOLUTE: 0 K/UL (ref 0–0.2)
BASOPHILS RELATIVE PERCENT: 0.3 %
BLOOD BANK DISPENSE STATUS: NORMAL
BLOOD BANK PRODUCT CODE: NORMAL
BPU ID: NORMAL
BUN BLDV-MCNC: 21 MG/DL (ref 7–20)
CALCIUM SERPL-MCNC: 8.2 MG/DL (ref 8.3–10.6)
CHLORIDE BLD-SCNC: 114 MMOL/L (ref 99–110)
CO2: 22 MMOL/L (ref 21–32)
CREAT SERPL-MCNC: 1 MG/DL (ref 0.8–1.3)
DESCRIPTION BLOOD BANK: NORMAL
EOSINOPHILS ABSOLUTE: 0.1 K/UL (ref 0–0.6)
EOSINOPHILS RELATIVE PERCENT: 0.9 %
GFR AFRICAN AMERICAN: >60
GFR NON-AFRICAN AMERICAN: >60
GLUCOSE BLD-MCNC: 88 MG/DL (ref 70–99)
HCT VFR BLD CALC: 21.6 % (ref 40.5–52.5)
HCT VFR BLD CALC: 23.9 % (ref 40.5–52.5)
HCT VFR BLD CALC: 25.3 % (ref 40.5–52.5)
HCT VFR BLD CALC: 28.5 % (ref 40.5–52.5)
HEMOGLOBIN: 7.1 G/DL (ref 13.5–17.5)
HEMOGLOBIN: 7.9 G/DL (ref 13.5–17.5)
HEMOGLOBIN: 8.4 G/DL (ref 13.5–17.5)
HEMOGLOBIN: 9 G/DL (ref 13.5–17.5)
LYMPHOCYTES ABSOLUTE: 1.3 K/UL (ref 1–5.1)
LYMPHOCYTES RELATIVE PERCENT: 16.1 %
MCH RBC QN AUTO: 30.2 PG (ref 26–34)
MCHC RBC AUTO-ENTMCNC: 33.1 G/DL (ref 31–36)
MCV RBC AUTO: 91 FL (ref 80–100)
MONOCYTES ABSOLUTE: 0.7 K/UL (ref 0–1.3)
MONOCYTES RELATIVE PERCENT: 8.9 %
NEUTROPHILS ABSOLUTE: 6.2 K/UL (ref 1.7–7.7)
NEUTROPHILS RELATIVE PERCENT: 73.8 %
PDW BLD-RTO: 15.4 % (ref 12.4–15.4)
PLATELET # BLD: 162 K/UL (ref 135–450)
PMV BLD AUTO: 8.8 FL (ref 5–10.5)
POTASSIUM REFLEX MAGNESIUM: 3.8 MMOL/L (ref 3.5–5.1)
RBC # BLD: 2.62 M/UL (ref 4.2–5.9)
SODIUM BLD-SCNC: 146 MMOL/L (ref 136–145)
TSH REFLEX: 1.74 UIU/ML (ref 0.27–4.2)
WBC # BLD: 8.4 K/UL (ref 4–11)

## 2019-02-09 PROCEDURE — 2000000000 HC ICU R&B

## 2019-02-09 PROCEDURE — 99233 SBSQ HOSP IP/OBS HIGH 50: CPT | Performed by: INTERNAL MEDICINE

## 2019-02-09 PROCEDURE — 0DJ08ZZ INSPECTION OF UPPER INTESTINAL TRACT, VIA NATURAL OR ARTIFICIAL OPENING ENDOSCOPIC: ICD-10-PCS | Performed by: INTERNAL MEDICINE

## 2019-02-09 PROCEDURE — 6360000002 HC RX W HCPCS: Performed by: STUDENT IN AN ORGANIZED HEALTH CARE EDUCATION/TRAINING PROGRAM

## 2019-02-09 PROCEDURE — 0DJD8ZZ INSPECTION OF LOWER INTESTINAL TRACT, VIA NATURAL OR ARTIFICIAL OPENING ENDOSCOPIC: ICD-10-PCS | Performed by: INTERNAL MEDICINE

## 2019-02-09 PROCEDURE — 99291 CRITICAL CARE FIRST HOUR: CPT | Performed by: NURSE PRACTITIONER

## 2019-02-09 PROCEDURE — 85018 HEMOGLOBIN: CPT

## 2019-02-09 PROCEDURE — 6370000000 HC RX 637 (ALT 250 FOR IP): Performed by: STUDENT IN AN ORGANIZED HEALTH CARE EDUCATION/TRAINING PROGRAM

## 2019-02-09 PROCEDURE — 86923 COMPATIBILITY TEST ELECTRIC: CPT

## 2019-02-09 PROCEDURE — 85014 HEMATOCRIT: CPT

## 2019-02-09 PROCEDURE — 85025 COMPLETE CBC W/AUTO DIFF WBC: CPT

## 2019-02-09 PROCEDURE — P9016 RBC LEUKOCYTES REDUCED: HCPCS

## 2019-02-09 PROCEDURE — C9113 INJ PANTOPRAZOLE SODIUM, VIA: HCPCS | Performed by: STUDENT IN AN ORGANIZED HEALTH CARE EDUCATION/TRAINING PROGRAM

## 2019-02-09 PROCEDURE — 36430 TRANSFUSION BLD/BLD COMPNT: CPT

## 2019-02-09 PROCEDURE — 74018 RADEX ABDOMEN 1 VIEW: CPT

## 2019-02-09 PROCEDURE — 6360000002 HC RX W HCPCS

## 2019-02-09 PROCEDURE — 3609012800 HC EGD DIAGNOSTIC ONLY: Performed by: INTERNAL MEDICINE

## 2019-02-09 PROCEDURE — 99152 MOD SED SAME PHYS/QHP 5/>YRS: CPT | Performed by: INTERNAL MEDICINE

## 2019-02-09 PROCEDURE — 84443 ASSAY THYROID STIM HORMONE: CPT

## 2019-02-09 PROCEDURE — 99153 MOD SED SAME PHYS/QHP EA: CPT | Performed by: INTERNAL MEDICINE

## 2019-02-09 PROCEDURE — 0D9670Z DRAINAGE OF STOMACH WITH DRAINAGE DEVICE, VIA NATURAL OR ARTIFICIAL OPENING: ICD-10-PCS | Performed by: STUDENT IN AN ORGANIZED HEALTH CARE EDUCATION/TRAINING PROGRAM

## 2019-02-09 PROCEDURE — 80048 BASIC METABOLIC PNL TOTAL CA: CPT

## 2019-02-09 PROCEDURE — 3609009500 HC COLONOSCOPY DIAGNOSTIC OR SCREENING: Performed by: INTERNAL MEDICINE

## 2019-02-09 PROCEDURE — 2580000003 HC RX 258: Performed by: STUDENT IN AN ORGANIZED HEALTH CARE EDUCATION/TRAINING PROGRAM

## 2019-02-09 RX ORDER — PROPOFOL 10 MG/ML
INJECTION, EMULSION INTRAVENOUS
Status: COMPLETED
Start: 2019-02-09 | End: 2019-02-09

## 2019-02-09 RX ORDER — MIDAZOLAM HYDROCHLORIDE 1 MG/ML
INJECTION INTRAMUSCULAR; INTRAVENOUS
Status: DISCONTINUED
Start: 2019-02-09 | End: 2019-02-10

## 2019-02-09 RX ORDER — CLOPIDOGREL BISULFATE 75 MG/1
75 TABLET ORAL DAILY
Status: DISCONTINUED | OUTPATIENT
Start: 2019-02-10 | End: 2019-02-12 | Stop reason: HOSPADM

## 2019-02-09 RX ORDER — METOPROLOL SUCCINATE 100 MG/1
100 TABLET, EXTENDED RELEASE ORAL DAILY
Status: DISCONTINUED | OUTPATIENT
Start: 2019-02-09 | End: 2019-02-11

## 2019-02-09 RX ORDER — PANTOPRAZOLE SODIUM 40 MG/1
40 TABLET, DELAYED RELEASE ORAL
Status: DISCONTINUED | OUTPATIENT
Start: 2019-02-10 | End: 2019-02-12 | Stop reason: HOSPADM

## 2019-02-09 RX ORDER — ASPIRIN 81 MG/1
81 TABLET, CHEWABLE ORAL DAILY
Status: DISCONTINUED | OUTPATIENT
Start: 2019-02-10 | End: 2019-02-12 | Stop reason: HOSPADM

## 2019-02-09 RX ORDER — LISINOPRIL 20 MG/1
20 TABLET ORAL DAILY
Status: DISCONTINUED | OUTPATIENT
Start: 2019-02-10 | End: 2019-02-12 | Stop reason: HOSPADM

## 2019-02-09 RX ADMIN — SODIUM CHLORIDE: 9 INJECTION, SOLUTION INTRAVENOUS at 06:50

## 2019-02-09 RX ADMIN — PROPOFOL 80 MG: 10 INJECTION, EMULSION INTRAVENOUS at 15:29

## 2019-02-09 RX ADMIN — ONDANSETRON 4 MG: 2 INJECTION INTRAMUSCULAR; INTRAVENOUS at 12:15

## 2019-02-09 RX ADMIN — POLYETHYLENE GLYCOL 3350, SODIUM SULFATE ANHYDROUS, SODIUM BICARBONATE, SODIUM CHLORIDE, POTASSIUM CHLORIDE 4000 ML: 236; 22.74; 6.74; 5.86; 2.97 POWDER, FOR SOLUTION ORAL at 10:25

## 2019-02-09 RX ADMIN — SODIUM CHLORIDE 8 MG/HR: 900 INJECTION, SOLUTION INTRAVENOUS at 11:40

## 2019-02-09 RX ADMIN — Medication 10 ML: at 09:00

## 2019-02-09 RX ADMIN — SODIUM CHLORIDE 8 MG/HR: 900 INJECTION, SOLUTION INTRAVENOUS at 00:20

## 2019-02-09 RX ADMIN — SODIUM CHLORIDE: 9 INJECTION, SOLUTION INTRAVENOUS at 18:12

## 2019-02-09 RX ADMIN — METOPROLOL SUCCINATE 100 MG: 100 TABLET, EXTENDED RELEASE ORAL at 18:04

## 2019-02-09 ASSESSMENT — PAIN SCALES - GENERAL
PAINLEVEL_OUTOF10: 0

## 2019-02-09 ASSESSMENT — PAIN - FUNCTIONAL ASSESSMENT: PAIN_FUNCTIONAL_ASSESSMENT: 0-10

## 2019-02-10 LAB
ANION GAP SERPL CALCULATED.3IONS-SCNC: 10 MMOL/L (ref 3–16)
BASOPHILS ABSOLUTE: 0 K/UL (ref 0–0.2)
BASOPHILS RELATIVE PERCENT: 0.4 %
BUN BLDV-MCNC: 13 MG/DL (ref 7–20)
CALCIUM IONIZED: 1.18 MMOL/L (ref 1.12–1.32)
CALCIUM SERPL-MCNC: 8.2 MG/DL (ref 8.3–10.6)
CHLORIDE BLD-SCNC: 114 MMOL/L (ref 99–110)
CO2: 23 MMOL/L (ref 21–32)
CREAT SERPL-MCNC: 1.2 MG/DL (ref 0.8–1.3)
EOSINOPHILS ABSOLUTE: 0.2 K/UL (ref 0–0.6)
EOSINOPHILS RELATIVE PERCENT: 2.3 %
FERRITIN: 22.7 NG/ML (ref 30–400)
GFR AFRICAN AMERICAN: >60
GFR NON-AFRICAN AMERICAN: 58
GLUCOSE BLD-MCNC: 100 MG/DL (ref 70–99)
HCT VFR BLD CALC: 25.1 % (ref 40.5–52.5)
HCT VFR BLD CALC: 26.8 % (ref 40.5–52.5)
HEMOGLOBIN: 8.4 G/DL (ref 13.5–17.5)
HEMOGLOBIN: 8.8 G/DL (ref 13.5–17.5)
IRON SATURATION: 6 % (ref 20–50)
IRON: 17 UG/DL (ref 59–158)
LYMPHOCYTES ABSOLUTE: 1.2 K/UL (ref 1–5.1)
LYMPHOCYTES RELATIVE PERCENT: 17.7 %
MCH RBC QN AUTO: 30.4 PG (ref 26–34)
MCHC RBC AUTO-ENTMCNC: 33.3 G/DL (ref 31–36)
MCV RBC AUTO: 91.2 FL (ref 80–100)
MONOCYTES ABSOLUTE: 0.5 K/UL (ref 0–1.3)
MONOCYTES RELATIVE PERCENT: 8.2 %
NEUTROPHILS ABSOLUTE: 4.6 K/UL (ref 1.7–7.7)
NEUTROPHILS RELATIVE PERCENT: 71.4 %
PDW BLD-RTO: 15.8 % (ref 12.4–15.4)
PH VENOUS: 7.34 (ref 7.35–7.45)
PLATELET # BLD: 192 K/UL (ref 135–450)
PMV BLD AUTO: 9 FL (ref 5–10.5)
POTASSIUM REFLEX MAGNESIUM: 4 MMOL/L (ref 3.5–5.1)
RBC # BLD: 2.76 M/UL (ref 4.2–5.9)
SODIUM BLD-SCNC: 147 MMOL/L (ref 136–145)
TOTAL IRON BINDING CAPACITY: 290 UG/DL (ref 260–445)
TRANSFERRIN: 228 MG/DL (ref 200–360)
WBC # BLD: 6.5 K/UL (ref 4–11)

## 2019-02-10 PROCEDURE — 82306 VITAMIN D 25 HYDROXY: CPT

## 2019-02-10 PROCEDURE — 84466 ASSAY OF TRANSFERRIN: CPT

## 2019-02-10 PROCEDURE — 99233 SBSQ HOSP IP/OBS HIGH 50: CPT | Performed by: NURSE PRACTITIONER

## 2019-02-10 PROCEDURE — 6370000000 HC RX 637 (ALT 250 FOR IP): Performed by: STUDENT IN AN ORGANIZED HEALTH CARE EDUCATION/TRAINING PROGRAM

## 2019-02-10 PROCEDURE — 82728 ASSAY OF FERRITIN: CPT

## 2019-02-10 PROCEDURE — 2580000003 HC RX 258: Performed by: STUDENT IN AN ORGANIZED HEALTH CARE EDUCATION/TRAINING PROGRAM

## 2019-02-10 PROCEDURE — 36415 COLL VENOUS BLD VENIPUNCTURE: CPT

## 2019-02-10 PROCEDURE — 85014 HEMATOCRIT: CPT

## 2019-02-10 PROCEDURE — 85018 HEMOGLOBIN: CPT

## 2019-02-10 PROCEDURE — 83540 ASSAY OF IRON: CPT

## 2019-02-10 PROCEDURE — 80048 BASIC METABOLIC PNL TOTAL CA: CPT

## 2019-02-10 PROCEDURE — 99232 SBSQ HOSP IP/OBS MODERATE 35: CPT | Performed by: INTERNAL MEDICINE

## 2019-02-10 PROCEDURE — 85025 COMPLETE CBC W/AUTO DIFF WBC: CPT

## 2019-02-10 PROCEDURE — 1200000000 HC SEMI PRIVATE

## 2019-02-10 RX ORDER — SODIUM CHLORIDE 9 MG/ML
INJECTION, SOLUTION INTRAVENOUS CONTINUOUS
Status: DISCONTINUED | OUTPATIENT
Start: 2019-02-10 | End: 2019-02-10

## 2019-02-10 RX ORDER — SODIUM CHLORIDE 9 MG/ML
INJECTION, SOLUTION INTRAVENOUS
Status: DISPENSED
Start: 2019-02-10 | End: 2019-02-11

## 2019-02-10 RX ORDER — FERROUS SULFATE 325(65) MG
325 TABLET ORAL
Status: DISCONTINUED | OUTPATIENT
Start: 2019-02-11 | End: 2019-02-12 | Stop reason: HOSPADM

## 2019-02-10 RX ORDER — SODIUM CHLORIDE 450 MG/100ML
INJECTION, SOLUTION INTRAVENOUS CONTINUOUS
Status: DISCONTINUED | OUTPATIENT
Start: 2019-02-10 | End: 2019-02-11

## 2019-02-10 RX ADMIN — PANTOPRAZOLE SODIUM 40 MG: 40 TABLET, DELAYED RELEASE ORAL at 07:41

## 2019-02-10 RX ADMIN — SODIUM CHLORIDE: 4.5 INJECTION, SOLUTION INTRAVENOUS at 19:36

## 2019-02-10 RX ADMIN — ATORVASTATIN CALCIUM 40 MG: 40 TABLET, FILM COATED ORAL at 09:41

## 2019-02-10 RX ADMIN — ASPIRIN 81 MG 81 MG: 81 TABLET ORAL at 09:41

## 2019-02-10 RX ADMIN — CLOPIDOGREL 75 MG: 75 TABLET, FILM COATED ORAL at 09:41

## 2019-02-10 RX ADMIN — LISINOPRIL 20 MG: 20 TABLET ORAL at 09:41

## 2019-02-10 RX ADMIN — METOPROLOL SUCCINATE 100 MG: 100 TABLET, EXTENDED RELEASE ORAL at 09:41

## 2019-02-10 RX ADMIN — Medication 10 ML: at 19:36

## 2019-02-10 RX ADMIN — Medication 10 ML: at 09:41

## 2019-02-10 RX ADMIN — SODIUM CHLORIDE: 9 INJECTION, SOLUTION INTRAVENOUS at 16:15

## 2019-02-10 ASSESSMENT — PAIN SCALES - GENERAL
PAINLEVEL_OUTOF10: 0

## 2019-02-11 LAB
ANION GAP SERPL CALCULATED.3IONS-SCNC: 9 MMOL/L (ref 3–16)
BASOPHILS ABSOLUTE: 0 K/UL (ref 0–0.2)
BASOPHILS RELATIVE PERCENT: 0.3 %
BUN BLDV-MCNC: 12 MG/DL (ref 7–20)
CALCIUM SERPL-MCNC: 8.4 MG/DL (ref 8.3–10.6)
CHLORIDE BLD-SCNC: 109 MMOL/L (ref 99–110)
CO2: 26 MMOL/L (ref 21–32)
CREAT SERPL-MCNC: 1.1 MG/DL (ref 0.8–1.3)
EOSINOPHILS ABSOLUTE: 0.2 K/UL (ref 0–0.6)
EOSINOPHILS RELATIVE PERCENT: 2.2 %
GFR AFRICAN AMERICAN: >60
GFR NON-AFRICAN AMERICAN: >60
GLUCOSE BLD-MCNC: 115 MG/DL (ref 70–99)
HCT VFR BLD CALC: 25.5 % (ref 40.5–52.5)
HEMOGLOBIN: 8.4 G/DL (ref 13.5–17.5)
LYMPHOCYTES ABSOLUTE: 1.2 K/UL (ref 1–5.1)
LYMPHOCYTES RELATIVE PERCENT: 17.3 %
MCH RBC QN AUTO: 30.2 PG (ref 26–34)
MCHC RBC AUTO-ENTMCNC: 33 G/DL (ref 31–36)
MCV RBC AUTO: 91.5 FL (ref 80–100)
MONOCYTES ABSOLUTE: 0.7 K/UL (ref 0–1.3)
MONOCYTES RELATIVE PERCENT: 9.4 %
NEUTROPHILS ABSOLUTE: 5 K/UL (ref 1.7–7.7)
NEUTROPHILS RELATIVE PERCENT: 70.8 %
PDW BLD-RTO: 15.7 % (ref 12.4–15.4)
PLATELET # BLD: 205 K/UL (ref 135–450)
PMV BLD AUTO: 8.9 FL (ref 5–10.5)
POTASSIUM REFLEX MAGNESIUM: 3.7 MMOL/L (ref 3.5–5.1)
RBC # BLD: 2.78 M/UL (ref 4.2–5.9)
SODIUM BLD-SCNC: 144 MMOL/L (ref 136–145)
VITAMIN D 25-HYDROXY: 18.8 NG/ML
WBC # BLD: 7.1 K/UL (ref 4–11)

## 2019-02-11 PROCEDURE — 2580000003 HC RX 258: Performed by: STUDENT IN AN ORGANIZED HEALTH CARE EDUCATION/TRAINING PROGRAM

## 2019-02-11 PROCEDURE — 6370000000 HC RX 637 (ALT 250 FOR IP): Performed by: STUDENT IN AN ORGANIZED HEALTH CARE EDUCATION/TRAINING PROGRAM

## 2019-02-11 PROCEDURE — 80048 BASIC METABOLIC PNL TOTAL CA: CPT

## 2019-02-11 PROCEDURE — 94761 N-INVAS EAR/PLS OXIMETRY MLT: CPT

## 2019-02-11 PROCEDURE — 6370000000 HC RX 637 (ALT 250 FOR IP): Performed by: INTERNAL MEDICINE

## 2019-02-11 PROCEDURE — 85025 COMPLETE CBC W/AUTO DIFF WBC: CPT

## 2019-02-11 PROCEDURE — 6360000002 HC RX W HCPCS: Performed by: INTERNAL MEDICINE

## 2019-02-11 PROCEDURE — 36415 COLL VENOUS BLD VENIPUNCTURE: CPT

## 2019-02-11 PROCEDURE — 2580000003 HC RX 258: Performed by: INTERNAL MEDICINE

## 2019-02-11 PROCEDURE — 99233 SBSQ HOSP IP/OBS HIGH 50: CPT | Performed by: NURSE PRACTITIONER

## 2019-02-11 PROCEDURE — 6370000000 HC RX 637 (ALT 250 FOR IP): Performed by: NURSE PRACTITIONER

## 2019-02-11 PROCEDURE — 1200000000 HC SEMI PRIVATE

## 2019-02-11 RX ORDER — POTASSIUM CHLORIDE 20 MEQ/1
40 TABLET, EXTENDED RELEASE ORAL ONCE
Status: COMPLETED | OUTPATIENT
Start: 2019-02-11 | End: 2019-02-11

## 2019-02-11 RX ORDER — METOPROLOL TARTRATE 100 MG/1
100 TABLET ORAL ONCE
Status: COMPLETED | OUTPATIENT
Start: 2019-02-11 | End: 2019-02-11

## 2019-02-11 RX ORDER — 0.9 % SODIUM CHLORIDE 0.9 %
1000 INTRAVENOUS SOLUTION INTRAVENOUS ONCE
Status: COMPLETED | OUTPATIENT
Start: 2019-02-11 | End: 2019-02-11

## 2019-02-11 RX ORDER — METOPROLOL SUCCINATE 100 MG/1
200 TABLET, EXTENDED RELEASE ORAL DAILY
Status: DISCONTINUED | OUTPATIENT
Start: 2019-02-12 | End: 2019-02-12 | Stop reason: HOSPADM

## 2019-02-11 RX ORDER — ERGOCALCIFEROL 1.25 MG/1
50000 CAPSULE ORAL
Status: DISCONTINUED | OUTPATIENT
Start: 2019-02-11 | End: 2019-02-12 | Stop reason: HOSPADM

## 2019-02-11 RX ADMIN — CLOPIDOGREL 75 MG: 75 TABLET, FILM COATED ORAL at 07:58

## 2019-02-11 RX ADMIN — LISINOPRIL 20 MG: 20 TABLET ORAL at 07:59

## 2019-02-11 RX ADMIN — SODIUM CHLORIDE 1000 ML: 9 INJECTION, SOLUTION INTRAVENOUS at 12:03

## 2019-02-11 RX ADMIN — METOPROLOL TARTRATE 100 MG: 100 TABLET ORAL at 15:53

## 2019-02-11 RX ADMIN — SODIUM CHLORIDE: 4.5 INJECTION, SOLUTION INTRAVENOUS at 05:45

## 2019-02-11 RX ADMIN — PANTOPRAZOLE SODIUM 40 MG: 40 TABLET, DELAYED RELEASE ORAL at 06:04

## 2019-02-11 RX ADMIN — Medication 10 ML: at 19:52

## 2019-02-11 RX ADMIN — ASPIRIN 81 MG 81 MG: 81 TABLET ORAL at 07:58

## 2019-02-11 RX ADMIN — ERGOCALCIFEROL 50000 UNITS: 1.25 CAPSULE ORAL at 10:49

## 2019-02-11 RX ADMIN — ATORVASTATIN CALCIUM 40 MG: 40 TABLET, FILM COATED ORAL at 07:58

## 2019-02-11 RX ADMIN — Medication 10 ML: at 08:00

## 2019-02-11 RX ADMIN — FERROUS SULFATE TAB 325 MG (65 MG ELEMENTAL FE) 325 MG: 325 (65 FE) TAB at 07:58

## 2019-02-11 RX ADMIN — METOPROLOL SUCCINATE 100 MG: 100 TABLET, EXTENDED RELEASE ORAL at 07:58

## 2019-02-11 RX ADMIN — IRON SUCROSE 100 MG: 20 INJECTION, SOLUTION INTRAVENOUS at 18:19

## 2019-02-11 RX ADMIN — POTASSIUM CHLORIDE 40 MEQ: 20 TABLET, EXTENDED RELEASE ORAL at 10:49

## 2019-02-11 ASSESSMENT — PAIN SCALES - GENERAL
PAINLEVEL_OUTOF10: 0

## 2019-02-12 VITALS
SYSTOLIC BLOOD PRESSURE: 128 MMHG | HEART RATE: 82 BPM | BODY MASS INDEX: 29.57 KG/M2 | OXYGEN SATURATION: 99 % | TEMPERATURE: 97.8 F | RESPIRATION RATE: 30 BRPM | HEIGHT: 74 IN | DIASTOLIC BLOOD PRESSURE: 91 MMHG | WEIGHT: 230.38 LBS

## 2019-02-12 LAB
ANION GAP SERPL CALCULATED.3IONS-SCNC: 9 MMOL/L (ref 3–16)
BASOPHILS ABSOLUTE: 0 K/UL (ref 0–0.2)
BASOPHILS RELATIVE PERCENT: 0.2 %
BUN BLDV-MCNC: 12 MG/DL (ref 7–20)
CALCIUM SERPL-MCNC: 8.5 MG/DL (ref 8.3–10.6)
CHLORIDE BLD-SCNC: 110 MMOL/L (ref 99–110)
CO2: 25 MMOL/L (ref 21–32)
CREAT SERPL-MCNC: 1.2 MG/DL (ref 0.8–1.3)
EOSINOPHILS ABSOLUTE: 0.2 K/UL (ref 0–0.6)
EOSINOPHILS RELATIVE PERCENT: 2.5 %
GFR AFRICAN AMERICAN: >60
GFR NON-AFRICAN AMERICAN: 58
GLUCOSE BLD-MCNC: 94 MG/DL (ref 70–99)
HCT VFR BLD CALC: 27.2 % (ref 40.5–52.5)
HEMOGLOBIN: 8.8 G/DL (ref 13.5–17.5)
LYMPHOCYTES ABSOLUTE: 1.2 K/UL (ref 1–5.1)
LYMPHOCYTES RELATIVE PERCENT: 16.4 %
MCH RBC QN AUTO: 29.8 PG (ref 26–34)
MCHC RBC AUTO-ENTMCNC: 32.3 G/DL (ref 31–36)
MCV RBC AUTO: 92.3 FL (ref 80–100)
MONOCYTES ABSOLUTE: 0.6 K/UL (ref 0–1.3)
MONOCYTES RELATIVE PERCENT: 8.3 %
NEUTROPHILS ABSOLUTE: 5.3 K/UL (ref 1.7–7.7)
NEUTROPHILS RELATIVE PERCENT: 72.6 %
PDW BLD-RTO: 15.8 % (ref 12.4–15.4)
PLATELET # BLD: 210 K/UL (ref 135–450)
PMV BLD AUTO: 9 FL (ref 5–10.5)
POTASSIUM REFLEX MAGNESIUM: 3.9 MMOL/L (ref 3.5–5.1)
RBC # BLD: 2.94 M/UL (ref 4.2–5.9)
SODIUM BLD-SCNC: 144 MMOL/L (ref 136–145)
WBC # BLD: 7.3 K/UL (ref 4–11)

## 2019-02-12 PROCEDURE — 36415 COLL VENOUS BLD VENIPUNCTURE: CPT

## 2019-02-12 PROCEDURE — 99232 SBSQ HOSP IP/OBS MODERATE 35: CPT | Performed by: NURSE PRACTITIONER

## 2019-02-12 PROCEDURE — 6370000000 HC RX 637 (ALT 250 FOR IP): Performed by: NURSE PRACTITIONER

## 2019-02-12 PROCEDURE — 80048 BASIC METABOLIC PNL TOTAL CA: CPT

## 2019-02-12 PROCEDURE — 94761 N-INVAS EAR/PLS OXIMETRY MLT: CPT

## 2019-02-12 PROCEDURE — 85025 COMPLETE CBC W/AUTO DIFF WBC: CPT

## 2019-02-12 PROCEDURE — 6370000000 HC RX 637 (ALT 250 FOR IP): Performed by: STUDENT IN AN ORGANIZED HEALTH CARE EDUCATION/TRAINING PROGRAM

## 2019-02-12 PROCEDURE — 2580000003 HC RX 258: Performed by: STUDENT IN AN ORGANIZED HEALTH CARE EDUCATION/TRAINING PROGRAM

## 2019-02-12 RX ORDER — FERROUS SULFATE 325(65) MG
325 TABLET ORAL
Qty: 30 TABLET | Refills: 0 | Status: SHIPPED | OUTPATIENT
Start: 2019-02-12

## 2019-02-12 RX ORDER — ERGOCALCIFEROL (VITAMIN D2) 1250 MCG
50000 CAPSULE ORAL WEEKLY
Qty: 8 CAPSULE | Refills: 0 | Status: SHIPPED | OUTPATIENT
Start: 2019-02-12 | End: 2019-03-11 | Stop reason: SDUPTHER

## 2019-02-12 RX ADMIN — PANTOPRAZOLE SODIUM 40 MG: 40 TABLET, DELAYED RELEASE ORAL at 06:20

## 2019-02-12 RX ADMIN — METOPROLOL SUCCINATE 200 MG: 100 TABLET, EXTENDED RELEASE ORAL at 08:01

## 2019-02-12 RX ADMIN — CLOPIDOGREL 75 MG: 75 TABLET, FILM COATED ORAL at 08:00

## 2019-02-12 RX ADMIN — Medication 10 ML: at 08:01

## 2019-02-12 RX ADMIN — ATORVASTATIN CALCIUM 40 MG: 40 TABLET, FILM COATED ORAL at 08:00

## 2019-02-12 RX ADMIN — LISINOPRIL 20 MG: 20 TABLET ORAL at 08:00

## 2019-02-12 RX ADMIN — ASPIRIN 81 MG 81 MG: 81 TABLET ORAL at 08:00

## 2019-02-12 ASSESSMENT — PAIN SCALES - GENERAL
PAINLEVEL_OUTOF10: 0

## 2019-02-19 ENCOUNTER — OFFICE VISIT (OUTPATIENT)
Dept: CARDIOLOGY CLINIC | Age: 79
End: 2019-02-19
Payer: MEDICARE

## 2019-02-19 VITALS
HEART RATE: 85 BPM | DIASTOLIC BLOOD PRESSURE: 70 MMHG | WEIGHT: 225.6 LBS | BODY MASS INDEX: 28.97 KG/M2 | SYSTOLIC BLOOD PRESSURE: 134 MMHG

## 2019-02-19 DIAGNOSIS — E78.2 MIXED HYPERLIPIDEMIA: ICD-10-CM

## 2019-02-19 DIAGNOSIS — I48.19 PERSISTENT ATRIAL FIBRILLATION (HCC): ICD-10-CM

## 2019-02-19 DIAGNOSIS — I10 ESSENTIAL HYPERTENSION: ICD-10-CM

## 2019-02-19 DIAGNOSIS — I25.10 CORONARY ARTERY DISEASE INVOLVING NATIVE CORONARY ARTERY OF NATIVE HEART WITHOUT ANGINA PECTORIS: Primary | ICD-10-CM

## 2019-02-19 PROCEDURE — 99214 OFFICE O/P EST MOD 30 MIN: CPT | Performed by: NURSE PRACTITIONER

## 2019-02-19 RX ORDER — LISINOPRIL 40 MG/1
40 TABLET ORAL DAILY
Qty: 90 TABLET | Refills: 3 | OUTPATIENT
Start: 2019-02-19

## 2019-03-01 DIAGNOSIS — I25.10 CORONARY ARTERY DISEASE INVOLVING NATIVE CORONARY ARTERY OF NATIVE HEART WITHOUT ANGINA PECTORIS: ICD-10-CM

## 2019-03-01 DIAGNOSIS — I48.19 PERSISTENT ATRIAL FIBRILLATION (HCC): ICD-10-CM

## 2019-03-01 DIAGNOSIS — E78.2 MIXED HYPERLIPIDEMIA: ICD-10-CM

## 2019-03-01 DIAGNOSIS — I10 ESSENTIAL HYPERTENSION: ICD-10-CM

## 2019-03-01 LAB
A/G RATIO: 1.3 (ref 1.1–2.2)
ALBUMIN SERPL-MCNC: 3.8 G/DL (ref 3.4–5)
ALP BLD-CCNC: 83 U/L (ref 40–129)
ALT SERPL-CCNC: 12 U/L (ref 10–40)
ANION GAP SERPL CALCULATED.3IONS-SCNC: 14 MMOL/L (ref 3–16)
AST SERPL-CCNC: 13 U/L (ref 15–37)
BILIRUB SERPL-MCNC: 0.5 MG/DL (ref 0–1)
BUN BLDV-MCNC: 11 MG/DL (ref 7–20)
CALCIUM SERPL-MCNC: 9.2 MG/DL (ref 8.3–10.6)
CHLORIDE BLD-SCNC: 107 MMOL/L (ref 99–110)
CHOLESTEROL, TOTAL: 86 MG/DL (ref 0–199)
CO2: 23 MMOL/L (ref 21–32)
CREAT SERPL-MCNC: 1.3 MG/DL (ref 0.8–1.3)
GFR AFRICAN AMERICAN: >60
GFR NON-AFRICAN AMERICAN: 53
GLOBULIN: 3 G/DL
GLUCOSE BLD-MCNC: 106 MG/DL (ref 70–99)
HDLC SERPL-MCNC: 44 MG/DL (ref 40–60)
LDL CHOLESTEROL CALCULATED: 32 MG/DL
POTASSIUM SERPL-SCNC: 4 MMOL/L (ref 3.5–5.1)
SODIUM BLD-SCNC: 144 MMOL/L (ref 136–145)
TOTAL PROTEIN: 6.8 G/DL (ref 6.4–8.2)
TRIGL SERPL-MCNC: 48 MG/DL (ref 0–150)
VLDLC SERPL CALC-MCNC: 10 MG/DL

## 2019-03-04 ENCOUNTER — OFFICE VISIT (OUTPATIENT)
Dept: CARDIOLOGY CLINIC | Age: 79
End: 2019-03-04
Payer: MEDICARE

## 2019-03-04 VITALS
HEART RATE: 90 BPM | DIASTOLIC BLOOD PRESSURE: 84 MMHG | SYSTOLIC BLOOD PRESSURE: 146 MMHG | WEIGHT: 220 LBS | BODY MASS INDEX: 28.25 KG/M2

## 2019-03-04 DIAGNOSIS — I25.10 CORONARY ARTERY DISEASE INVOLVING NATIVE CORONARY ARTERY OF NATIVE HEART WITHOUT ANGINA PECTORIS: Primary | ICD-10-CM

## 2019-03-04 DIAGNOSIS — E78.2 MIXED HYPERLIPIDEMIA: ICD-10-CM

## 2019-03-04 DIAGNOSIS — I10 ESSENTIAL HYPERTENSION: ICD-10-CM

## 2019-03-04 DIAGNOSIS — I48.19 PERSISTENT ATRIAL FIBRILLATION (HCC): ICD-10-CM

## 2019-03-04 DIAGNOSIS — R06.02 SHORTNESS OF BREATH: ICD-10-CM

## 2019-03-04 PROCEDURE — 99214 OFFICE O/P EST MOD 30 MIN: CPT | Performed by: INTERNAL MEDICINE

## 2019-03-04 RX ORDER — CHLORTHALIDONE 25 MG/1
25 TABLET ORAL DAILY
Qty: 60 TABLET | Refills: 5 | Status: SHIPPED | OUTPATIENT
Start: 2019-03-04 | End: 2019-03-22 | Stop reason: SINTOL

## 2019-03-05 ENCOUNTER — TELEPHONE (OUTPATIENT)
Dept: CARDIOLOGY CLINIC | Age: 79
End: 2019-03-05

## 2019-03-11 ENCOUNTER — OFFICE VISIT (OUTPATIENT)
Dept: CARDIOLOGY CLINIC | Age: 79
End: 2019-03-11
Payer: MEDICARE

## 2019-03-11 VITALS
WEIGHT: 215.8 LBS | SYSTOLIC BLOOD PRESSURE: 124 MMHG | DIASTOLIC BLOOD PRESSURE: 80 MMHG | HEART RATE: 84 BPM | BODY MASS INDEX: 27.71 KG/M2

## 2019-03-11 DIAGNOSIS — E78.2 MIXED HYPERLIPIDEMIA: ICD-10-CM

## 2019-03-11 DIAGNOSIS — I10 ESSENTIAL HYPERTENSION: ICD-10-CM

## 2019-03-11 DIAGNOSIS — I48.19 PERSISTENT ATRIAL FIBRILLATION (HCC): ICD-10-CM

## 2019-03-11 DIAGNOSIS — I25.10 CORONARY ARTERY DISEASE INVOLVING NATIVE CORONARY ARTERY OF NATIVE HEART WITHOUT ANGINA PECTORIS: Primary | ICD-10-CM

## 2019-03-11 DIAGNOSIS — I25.10 CORONARY ARTERY DISEASE INVOLVING NATIVE CORONARY ARTERY OF NATIVE HEART WITHOUT ANGINA PECTORIS: ICD-10-CM

## 2019-03-11 LAB
A/G RATIO: 1.2 (ref 1.1–2.2)
ALBUMIN SERPL-MCNC: 4.2 G/DL (ref 3.4–5)
ALP BLD-CCNC: 91 U/L (ref 40–129)
ALT SERPL-CCNC: 13 U/L (ref 10–40)
ANION GAP SERPL CALCULATED.3IONS-SCNC: 17 MMOL/L (ref 3–16)
AST SERPL-CCNC: 17 U/L (ref 15–37)
BILIRUB SERPL-MCNC: 0.5 MG/DL (ref 0–1)
BUN BLDV-MCNC: 21 MG/DL (ref 7–20)
CALCIUM SERPL-MCNC: 10.4 MG/DL (ref 8.3–10.6)
CHLORIDE BLD-SCNC: 101 MMOL/L (ref 99–110)
CO2: 23 MMOL/L (ref 21–32)
CREAT SERPL-MCNC: 1.4 MG/DL (ref 0.8–1.3)
GFR AFRICAN AMERICAN: 59
GFR NON-AFRICAN AMERICAN: 49
GLOBULIN: 3.5 G/DL
GLUCOSE BLD-MCNC: 107 MG/DL (ref 70–99)
HCT VFR BLD CALC: 37.8 % (ref 40.5–52.5)
HEMOGLOBIN: 11.8 G/DL (ref 13.5–17.5)
MCH RBC QN AUTO: 27.4 PG (ref 26–34)
MCHC RBC AUTO-ENTMCNC: 31.2 G/DL (ref 31–36)
MCV RBC AUTO: 87.6 FL (ref 80–100)
PDW BLD-RTO: 15.2 % (ref 12.4–15.4)
PLATELET # BLD: 320 K/UL (ref 135–450)
PMV BLD AUTO: 9.7 FL (ref 5–10.5)
POTASSIUM SERPL-SCNC: 4.1 MMOL/L (ref 3.5–5.1)
RBC # BLD: 4.32 M/UL (ref 4.2–5.9)
SODIUM BLD-SCNC: 141 MMOL/L (ref 136–145)
TOTAL PROTEIN: 7.7 G/DL (ref 6.4–8.2)
WBC # BLD: 7.8 K/UL (ref 4–11)

## 2019-03-11 PROCEDURE — 99214 OFFICE O/P EST MOD 30 MIN: CPT | Performed by: NURSE PRACTITIONER

## 2019-03-11 RX ORDER — ATORVASTATIN CALCIUM 40 MG/1
40 TABLET, FILM COATED ORAL DAILY
Qty: 30 TABLET | Refills: 0 | Status: SHIPPED | OUTPATIENT
Start: 2019-03-11 | End: 2019-03-11 | Stop reason: SDUPTHER

## 2019-03-11 RX ORDER — PANTOPRAZOLE SODIUM 40 MG/1
40 TABLET, DELAYED RELEASE ORAL
Qty: 90 TABLET | Refills: 3 | Status: SHIPPED | OUTPATIENT
Start: 2019-03-11 | End: 2019-03-11 | Stop reason: SDUPTHER

## 2019-03-11 RX ORDER — PANTOPRAZOLE SODIUM 40 MG/1
40 TABLET, DELAYED RELEASE ORAL
Qty: 90 TABLET | Refills: 3 | Status: SHIPPED | OUTPATIENT
Start: 2019-03-11

## 2019-03-11 RX ORDER — ATORVASTATIN CALCIUM 40 MG/1
40 TABLET, FILM COATED ORAL DAILY
Qty: 90 TABLET | Refills: 3 | Status: SHIPPED | OUTPATIENT
Start: 2019-03-11

## 2019-03-11 RX ORDER — ATORVASTATIN CALCIUM 40 MG/1
40 TABLET, FILM COATED ORAL DAILY
Qty: 90 TABLET | Refills: 3 | Status: SHIPPED | OUTPATIENT
Start: 2019-03-11 | End: 2019-03-11 | Stop reason: SDUPTHER

## 2019-03-11 RX ORDER — PANTOPRAZOLE SODIUM 40 MG/1
40 TABLET, DELAYED RELEASE ORAL
Qty: 30 TABLET | Refills: 0 | Status: SHIPPED | OUTPATIENT
Start: 2019-03-11 | End: 2019-03-11 | Stop reason: SDUPTHER

## 2019-03-11 RX ORDER — ERGOCALCIFEROL (VITAMIN D2) 1250 MCG
50000 CAPSULE ORAL WEEKLY
Qty: 4 CAPSULE | Refills: 0 | Status: SHIPPED | OUTPATIENT
Start: 2019-03-11 | End: 2019-03-11 | Stop reason: SDUPTHER

## 2019-03-11 RX ORDER — ASPIRIN 81 MG/1
81 TABLET, CHEWABLE ORAL DAILY
Qty: 90 TABLET | Refills: 3 | Status: SHIPPED | OUTPATIENT
Start: 2019-03-11 | End: 2019-03-11 | Stop reason: SDUPTHER

## 2019-03-11 RX ORDER — ASPIRIN 81 MG/1
81 TABLET, CHEWABLE ORAL DAILY
Qty: 30 TABLET | Refills: 0 | Status: SHIPPED | OUTPATIENT
Start: 2019-03-11 | End: 2019-03-11 | Stop reason: SDUPTHER

## 2019-03-11 RX ORDER — ERGOCALCIFEROL (VITAMIN D2) 1250 MCG
50000 CAPSULE ORAL WEEKLY
Qty: 12 CAPSULE | Refills: 3 | Status: SHIPPED | OUTPATIENT
Start: 2019-03-11

## 2019-03-11 RX ORDER — ERGOCALCIFEROL (VITAMIN D2) 1250 MCG
50000 CAPSULE ORAL WEEKLY
Qty: 13 CAPSULE | Refills: 3 | Status: SHIPPED | OUTPATIENT
Start: 2019-03-11 | End: 2019-03-11 | Stop reason: SDUPTHER

## 2019-03-11 RX ORDER — ASPIRIN 81 MG/1
81 TABLET, CHEWABLE ORAL DAILY
Qty: 90 TABLET | Refills: 3 | Status: SHIPPED | OUTPATIENT
Start: 2019-03-11

## 2019-03-22 ENCOUNTER — TELEPHONE (OUTPATIENT)
Dept: CARDIOLOGY CLINIC | Age: 79
End: 2019-03-22

## 2019-03-22 DIAGNOSIS — I10 ESSENTIAL HYPERTENSION: Primary | ICD-10-CM

## 2019-03-22 RX ORDER — METOPROLOL SUCCINATE 200 MG/1
200 TABLET, EXTENDED RELEASE ORAL DAILY
Qty: 90 TABLET | Refills: 3 | Status: SHIPPED | OUTPATIENT
Start: 2019-03-22

## 2019-03-22 RX ORDER — FUROSEMIDE 20 MG/1
20 TABLET ORAL DAILY
Qty: 90 TABLET | Refills: 3 | Status: SHIPPED | OUTPATIENT
Start: 2019-03-22

## 2019-04-01 ENCOUNTER — TELEPHONE (OUTPATIENT)
Dept: CARDIOLOGY CLINIC | Age: 79
End: 2019-04-01

## 2019-04-01 NOTE — TELEPHONE ENCOUNTER
Unfortunately any diuretic could exacerbate gout. He should follow up with PCP for gout management. There are medications like allopurinol that could be used to manage the gout while still allowing him to use the diuretics.

## 2019-04-04 DIAGNOSIS — I10 ESSENTIAL HYPERTENSION: ICD-10-CM

## 2019-04-04 LAB
ANION GAP SERPL CALCULATED.3IONS-SCNC: 12 MMOL/L (ref 3–16)
BUN BLDV-MCNC: 22 MG/DL (ref 7–20)
CALCIUM SERPL-MCNC: 9.9 MG/DL (ref 8.3–10.6)
CHLORIDE BLD-SCNC: 103 MMOL/L (ref 99–110)
CO2: 28 MMOL/L (ref 21–32)
CREAT SERPL-MCNC: 1.5 MG/DL (ref 0.8–1.3)
GFR AFRICAN AMERICAN: 54
GFR NON-AFRICAN AMERICAN: 45
GLUCOSE BLD-MCNC: 129 MG/DL (ref 70–99)
POTASSIUM SERPL-SCNC: 3.6 MMOL/L (ref 3.5–5.1)
SODIUM BLD-SCNC: 143 MMOL/L (ref 136–145)

## 2019-04-08 DIAGNOSIS — I25.118 CORONARY ARTERY DISEASE OF NATIVE ARTERY OF NATIVE HEART WITH STABLE ANGINA PECTORIS (HCC): Primary | ICD-10-CM

## 2019-04-08 DIAGNOSIS — I48.91 ATRIAL FIBRILLATION, UNSPECIFIED TYPE (HCC): ICD-10-CM

## 2019-04-12 DIAGNOSIS — I48.91 ATRIAL FIBRILLATION, UNSPECIFIED TYPE (HCC): ICD-10-CM

## 2019-04-12 DIAGNOSIS — I25.118 CORONARY ARTERY DISEASE OF NATIVE ARTERY OF NATIVE HEART WITH STABLE ANGINA PECTORIS (HCC): ICD-10-CM

## 2019-04-12 LAB
ANION GAP SERPL CALCULATED.3IONS-SCNC: 13 MMOL/L (ref 3–16)
BUN BLDV-MCNC: 19 MG/DL (ref 7–20)
CALCIUM SERPL-MCNC: 9.3 MG/DL (ref 8.3–10.6)
CHLORIDE BLD-SCNC: 105 MMOL/L (ref 99–110)
CO2: 27 MMOL/L (ref 21–32)
CREAT SERPL-MCNC: 1.3 MG/DL (ref 0.8–1.3)
GFR AFRICAN AMERICAN: >60
GFR NON-AFRICAN AMERICAN: 53
GLUCOSE BLD-MCNC: 88 MG/DL (ref 70–99)
POTASSIUM SERPL-SCNC: 4.2 MMOL/L (ref 3.5–5.1)
SODIUM BLD-SCNC: 145 MMOL/L (ref 136–145)

## 2019-05-06 ENCOUNTER — OFFICE VISIT (OUTPATIENT)
Dept: CARDIOLOGY CLINIC | Age: 79
End: 2019-05-06
Payer: MEDICARE

## 2019-05-06 VITALS
SYSTOLIC BLOOD PRESSURE: 122 MMHG | BODY MASS INDEX: 27.86 KG/M2 | OXYGEN SATURATION: 96 % | WEIGHT: 217 LBS | DIASTOLIC BLOOD PRESSURE: 84 MMHG | HEART RATE: 85 BPM

## 2019-05-06 DIAGNOSIS — I10 ESSENTIAL HYPERTENSION: ICD-10-CM

## 2019-05-06 DIAGNOSIS — E78.2 MIXED HYPERLIPIDEMIA: ICD-10-CM

## 2019-05-06 DIAGNOSIS — I25.10 CORONARY ARTERY DISEASE INVOLVING NATIVE CORONARY ARTERY OF NATIVE HEART WITHOUT ANGINA PECTORIS: Primary | ICD-10-CM

## 2019-05-06 DIAGNOSIS — I51.9 LV DYSFUNCTION: ICD-10-CM

## 2019-05-06 DIAGNOSIS — I48.19 PERSISTENT ATRIAL FIBRILLATION (HCC): ICD-10-CM

## 2019-05-06 PROCEDURE — 99214 OFFICE O/P EST MOD 30 MIN: CPT | Performed by: INTERNAL MEDICINE

## 2019-05-06 RX ORDER — SPIRONOLACTONE 25 MG/1
25 TABLET ORAL DAILY
COMMUNITY

## 2019-05-06 RX ORDER — ALLOPURINOL 100 MG/1
100 TABLET ORAL DAILY
COMMUNITY

## 2019-05-06 NOTE — PROGRESS NOTES
joint swelling or muscular weakness/soreness. Neurological: No dizziness or headaches. No numbness/tingling, speech problems or weakness. No history of a stroke or TIA. Psychological: No anxiety or depression  Hematological and Lymphatic: No abnormal bleeding or bruising, blood clots, jaundice. Endocrine: No malaise/lethargy, palpitations, polydipsia/polyuria, temperature intolerance or unexpected weight changes. Skin: No rashes or non-healing ulcers. PE:  Blood pressure 122/84, pulse 85, weight 217 lb (98.4 kg), SpO2 96 %. General (appearance): Well devel. No distress. Eyes: anicteric. eomi  Neck: supple. No jvd  Ears/Nose/Mouth/Thorat: No cyanosis  CV: Irreg irreg. No m/r/g   Respiratory:  Clear b, normal respiratory effort  GI: abd soft. No peritoneal signs  Skin: Warm, dry. No rashes  Neuro/Psych: Alert and oriented x 3. Appropriate behavior  Ext:  No c/c. No pitting edema  Pulses:  2+ caroitd.  No bruits    Lab Results   Component Value Date    WBC 7.8 03/11/2019    HGB 11.8 (L) 03/11/2019    HCT 37.8 (L) 03/11/2019    MCV 87.6 03/11/2019     03/11/2019     Lab Results   Component Value Date     04/12/2019    K 4.2 04/12/2019     04/12/2019    CO2 27 04/12/2019    BUN 19 04/12/2019    CREATININE 1.3 04/12/2019    GLUCOSE 88 04/12/2019    CALCIUM 9.3 04/12/2019    PROT 7.7 03/11/2019    LABALBU 4.2 03/11/2019    BILITOT 0.5 03/11/2019    ALKPHOS 91 03/11/2019    AST 17 03/11/2019    ALT 13 03/11/2019    LABGLOM 53 (A) 04/12/2019    GFRAA >60 04/12/2019    AGRATIO 1.2 03/11/2019    GLOB 3.5 03/11/2019     Lab Results   Component Value Date    INR 1.39 (H) 02/08/2019    INR 1.15 (H) 01/29/2019    INR 1.50 (H) 06/26/2018    PROTIME 15.8 (H) 02/08/2019    PROTIME 13.1 (H) 01/29/2019    PROTIME 17.1 (H) 06/26/2018     Lab Results   Component Value Date    CHOL 86 03/01/2019    CHOL 125 01/30/2019    CHOL 195 03/17/2014     Lab Results   Component Value Date    TRIG 48 03/01/2019 TRIG 54 01/30/2019    TRIG 63 03/17/2014     Lab Results   Component Value Date    HDL 44 03/01/2019    HDL 41 01/30/2019    HDL 60 03/17/2014     Lab Results   Component Value Date    LDLCALC 32 03/01/2019    LDLCALC 73 01/30/2019    LDLCALC 122 (H) 03/17/2014     Lab Results   Component Value Date    LABVLDL 10 03/01/2019    LABVLDL 11 01/30/2019    LABVLDL 13 03/17/2014     No results found for: CHOLHDLRATIO    1/29/2019 Cath: Angiographic Findings: Co-dominant vessel  Left Main:  Minimal irregularities. Left Anterior Descending:  Mid 20% stenosis. 80% stenosis in the distal LAD prior to wrapping around the apex. Circumflex:  OM1 is very high and has an ostial/proximal 70% stenosis. OM4 and OM3 each ove 80% lesions. OM4 is a larger artery. Right Coronary:  Mid 20%. Distal 90%. This was stented with Two 3.5 x 12 mm Xience FELIX  Left Ventriculogram:  Not performed. 11/2018 Nuc Stress:  1. Mild reversible perfusion anterior wall, suggestive of ischemia. No fixed defects are identified   2. Left ventricular ejection fraction of greater than 65%, within normal limits.          Current Outpatient Medications:     spironolactone (ALDACTONE) 25 MG tablet, Take 25 mg by mouth daily, Disp: , Rfl:     allopurinol (ZYLOPRIM) 100 MG tablet, Take 100 mg by mouth daily, Disp: , Rfl:     metoprolol succinate (TOPROL XL) 200 MG extended release tablet, Take 1 tablet by mouth daily, Disp: 90 tablet, Rfl: 3    furosemide (LASIX) 20 MG tablet, Take 1 tablet by mouth daily, Disp: 90 tablet, Rfl: 3    pantoprazole (PROTONIX) 40 MG tablet, Take 1 tablet by mouth every morning (before breakfast), Disp: 90 tablet, Rfl: 3    atorvastatin (LIPITOR) 40 MG tablet, Take 1 tablet by mouth daily, Disp: 90 tablet, Rfl: 3    aspirin 81 MG chewable tablet, Take 1 tablet by mouth daily, Disp: 90 tablet, Rfl: 3    ergocalciferol (ERGOCALCIFEROL) 78893 units capsule, Take 1 capsule by mouth once a week, Disp: 12 capsule, Rfl: 3  

## 2019-06-19 ENCOUNTER — TELEPHONE (OUTPATIENT)
Dept: CARDIOLOGY CLINIC | Age: 79
End: 2019-06-19

## 2019-06-19 NOTE — TELEPHONE ENCOUNTER
Spoke with patient. Note written. Please mail to patient or call and have him  the letter    If possible he would like letter emailed   [email or web address]    Fabiola Quintana NP

## 2019-06-19 NOTE — LETTER
[address] [place] [address] Kristine Lundberg [address]  Phone: [phone]  Fax: [phone]    MD Jeffrey Fall NP    June 19, 2019     Patient: Rah Ruffin   YOB: 1940   Date of Visit: 6/19/2019     To Whom It May Concern: It is my medical opinion that Rah Ruffin has chronic atrial fibrillation and essential hypertension related to obstructive sleep apnea. We recommend management of the obstructive sleep apnea as part of the management of his chronic atrial fibrillation and hypertension. Current Outpatient Medications on File Prior to Visit   Medication Sig Dispense Refill    spironolactone (ALDACTONE) 25 MG tablet Take 25 mg by mouth daily      allopurinol (ZYLOPRIM) 100 MG tablet Take 100 mg by mouth daily      metoprolol succinate (TOPROL XL) 200 MG extended release tablet Take 1 tablet by mouth daily 90 tablet 3    furosemide (LASIX) 20 MG tablet Take 1 tablet by mouth daily 90 tablet 3    pantoprazole (PROTONIX) 40 MG tablet Take 1 tablet by mouth every morning (before breakfast) 90 tablet 3    atorvastatin (LIPITOR) 40 MG tablet Take 1 tablet by mouth daily 90 tablet 3    aspirin 81 MG chewable tablet Take 1 tablet by mouth daily 90 tablet 3    ergocalciferol (ERGOCALCIFEROL) 07986 units capsule Take 1 capsule by mouth once a week 12 capsule 3    apixaban (ELIQUIS) 5 MG TABS tablet Take 1 tablet by mouth 2 times daily 180 tablet 1    lisinopril (PRINIVIL;ZESTRIL) 40 MG tablet Take 1 tablet by mouth daily 90 tablet 3    ferrous sulfate 325 (65 Fe) MG tablet Take 1 tablet by mouth daily (with breakfast) 30 tablet 0    sildenafil (VIAGRA) 50 MG tablet Take 50 mg by mouth as needed. No current facility-administered medications on file prior to visit. If you have any questions or concerns, please don't hesitate to call.     Sincerely,      Jeffrey Villarreal NP

## 2019-06-19 NOTE — TELEPHONE ENCOUNTER
Lawanda,  Mr. Mesha Keller would like to speak with you. He said he working on a claim with the [employer] and needs some information. He is asking that you write a letter for him that includes his A-fib and HTN diagnosis and what medication he is currently taking. He said he needs to speak with you first because there is a statement you will need to include. He would not go into further detail.     Please call him @   [phone]

## 2019-06-24 ENCOUNTER — TELEPHONE (OUTPATIENT)
Dept: CARDIOLOGY CLINIC | Age: 79
End: 2019-06-24

## 2019-06-24 NOTE — TELEPHONE ENCOUNTER
Patient called requesting to speak with Duy Cheney about dcumentations needed for his Afib and HTN, please call him at [phone]

## 2019-08-14 ENCOUNTER — TELEPHONE (OUTPATIENT)
Dept: CARDIOLOGY CLINIC | Age: 79
End: 2019-08-14

## 2019-09-25 ENCOUNTER — OFFICE VISIT (OUTPATIENT)
Dept: CARDIOLOGY CLINIC | Age: 79
End: 2019-09-25
Payer: MEDICARE

## 2019-09-25 VITALS
HEART RATE: 100 BPM | BODY MASS INDEX: 28.25 KG/M2 | WEIGHT: 220 LBS | OXYGEN SATURATION: 97 % | DIASTOLIC BLOOD PRESSURE: 66 MMHG | SYSTOLIC BLOOD PRESSURE: 128 MMHG

## 2019-09-25 DIAGNOSIS — I10 ESSENTIAL HYPERTENSION: ICD-10-CM

## 2019-09-25 DIAGNOSIS — I48.19 PERSISTENT ATRIAL FIBRILLATION (HCC): ICD-10-CM

## 2019-09-25 DIAGNOSIS — E78.2 MIXED HYPERLIPIDEMIA: ICD-10-CM

## 2019-09-25 DIAGNOSIS — I25.10 CORONARY ARTERY DISEASE INVOLVING NATIVE CORONARY ARTERY OF NATIVE HEART WITHOUT ANGINA PECTORIS: Primary | ICD-10-CM

## 2019-09-25 PROCEDURE — 99213 OFFICE O/P EST LOW 20 MIN: CPT | Performed by: INTERNAL MEDICINE

## 2019-09-25 NOTE — PROGRESS NOTES
[de-identified] y.o. man here for f/u. Had PCI to his RCA in 1/2019, to come back for PCI of his LCx but had a large GI bleed (3U transfusion) after his RCA PCI. Has a fib and was on triple therapy at time of bleed. Scoped by GI for melana had diverticulosis. No intervention. Now, doing well. No angina. No sob. No n/v/LH/dizziness. No syncope. No orthopnea or PND. No palps. Overall feels good. Past Medical History:   Diagnosis Date    Abnormal EKG     Arthritis     Atrial fibrillation (HCC)     CAD S/P percutaneous coronary angioplasty 01/2019    RCA 3.5 x 12 mm Xience (2 overlapping stents).  HLD (hyperlipidemia)     Hypertension     Osteoarthritis     Pure hypercholesterolemia      Past Surgical History:   Procedure Laterality Date    CARDIOVERSION  1/9/15    COLONOSCOPY N/A 2/9/2019    COLONOSCOPY DIAGNOSTIC performed by Dragan Trujillo MD at [address] N/A 2/9/2019    EGD DIAGNOSTIC ONLY performed by Dragan Trujillo MD at [email or web address] History     Tobacco Use    Smoking status: Never Smoker    Smokeless tobacco: Never Used   Substance Use Topics    Alcohol use: No    Drug use: No     Allergies   Allergen Reactions    Morphine Nausea And Vomiting     Family History   Problem Relation Age of Onset    Heart Failure Brother      Review of Systems   General: No fevers, chills, fatigue, or night sweats. No abnormal changes in weight. HEENT: No blurry or decreased vision. No changes in hearing, nasal discharge or sore throat. Cardiovascular: See HPI. No cramping in legs or buttocks when walking. Respiratory: No cough, hemoptysis, or wheezing. No history of asthma. Gastrointestinal: No abdominal pain, hematochezia, melana, or history of GI ulcers. Genito-Urinary: No dysuria or hematuria. No urgency or polyuria. Musculoskeletal: No complaints of joint pain, joint swelling or muscular weakness/soreness.    Neurological: No dizziness

## 2019-10-09 ENCOUNTER — TELEPHONE (OUTPATIENT)
Dept: CARDIOLOGY CLINIC | Age: 79
End: 2019-10-09

## 2019-12-19 ENCOUNTER — HOSPITAL ENCOUNTER (EMERGENCY)
Age: 79
Discharge: HOME | End: 2019-12-19
Attending: EMERGENCY MEDICINE
Payer: MEDICARE

## 2019-12-19 VITALS
HEART RATE: 84 BPM | SYSTOLIC BLOOD PRESSURE: 150 MMHG | RESPIRATION RATE: 16 BRPM | DIASTOLIC BLOOD PRESSURE: 86 MMHG | OXYGEN SATURATION: 100 % | TEMPERATURE: 98.5 F

## 2019-12-19 DIAGNOSIS — R04.0 EPISTAXIS: Primary | ICD-10-CM

## 2019-12-19 LAB
ABO/RH: NORMAL
ANTIBODY SCREEN: NORMAL
HCT VFR BLD CALC: 37.3 % (ref 40.5–52.5)
HEMOGLOBIN: 12 G/DL (ref 13.5–17.5)
MCH RBC QN AUTO: 32.5 PG (ref 26–34)
MCHC RBC AUTO-ENTMCNC: 32.2 G/DL (ref 31–36)
MCV RBC AUTO: 100.9 FL (ref 80–100)
PDW BLD-RTO: 13.8 % (ref 12.4–15.4)
PLATELET # BLD: 194 K/UL (ref 135–450)
PMV BLD AUTO: 9.2 FL (ref 5–10.5)
RBC # BLD: 3.7 M/UL (ref 4.2–5.9)
WBC # BLD: 5.3 K/UL (ref 4–11)

## 2019-12-19 PROCEDURE — 86901 BLOOD TYPING SEROLOGIC RH(D): CPT

## 2019-12-19 PROCEDURE — 36415 COLL VENOUS BLD VENIPUNCTURE: CPT

## 2019-12-19 PROCEDURE — 85027 COMPLETE CBC AUTOMATED: CPT

## 2019-12-19 PROCEDURE — 86900 BLOOD TYPING SEROLOGIC ABO: CPT

## 2019-12-19 PROCEDURE — 99283 EMERGENCY DEPT VISIT LOW MDM: CPT

## 2019-12-19 PROCEDURE — 86850 RBC ANTIBODY SCREEN: CPT

## 2019-12-19 RX ORDER — OXYMETAZOLINE HYDROCHLORIDE 0.05 G/100ML
SPRAY NASAL
Status: DISCONTINUED
Start: 2019-12-19 | End: 2019-12-19 | Stop reason: HOSPADM

## 2020-01-14 ENCOUNTER — TELEPHONE (OUTPATIENT)
Dept: CARDIOLOGY CLINIC | Age: 80
End: 2020-01-14

## 2020-01-14 NOTE — TELEPHONE ENCOUNTER
Pt states that he hand delivered a letter for Riri Merida    Waiting on response      Says he dropped off 1/10/20

## 2020-04-22 ENCOUNTER — OFFICE VISIT (OUTPATIENT)
Dept: PRIMARY CARE CLINIC | Age: 80
End: 2020-04-22
Payer: MEDICARE

## 2020-04-22 VITALS — OXYGEN SATURATION: 97 % | HEART RATE: 101 BPM

## 2020-04-22 PROCEDURE — 99213 OFFICE O/P EST LOW 20 MIN: CPT | Performed by: NURSE PRACTITIONER

## 2020-04-22 NOTE — PATIENT INSTRUCTIONS
Advance Care Planning  People with COVID-19 may have no symptoms, mild symptoms, such as fever, cough, and shortness of breath or they may have more severe illness, developing severe and fatal pneumonia. As a result, Advance Care Planning with attention to naming a health care decision maker (someone you trust to make healthcare decisions for you if you could not speak for yourself) and sharing other health care preferences is important BEFORE a possible health crisis. Please contact your Primary Care Provider to discuss Advance Care Planning. Preventing the Spread of Coronavirus Disease 2019 in Homes and Residential Communities  For the most recent information go to [email or web address]    Prevention steps for People with confirmed or suspected COVID-19 (including persons under investigation) who do not need to be hospitalized  and   People with confirmed COVID-19 who were hospitalized and determined to be medically stable to go home    Your healthcare provider and public health staff will evaluate whether you can be cared for at home. If it is determined that you do not need to be hospitalized and can be isolated at home, you will be monitored by staff from your local or state health department. You should follow the prevention steps below until a healthcare provider or local or state health department says you can return to your normal activities. Stay home except to get medical care  People who are mildly ill with COVID-19 are able to isolate at home during their illness. You should restrict activities outside your home, except for getting medical care. Do not go to work, school, or public areas. Avoid using public transportation, ride-sharing, or taxis. Separate yourself from other people and animals in your home  People: As much as possible, you should stay in a specific room and away from other people in your home.  Also, you should use a separate have a medical emergency and need to call 911, notify the dispatch personnel that you have, or are being evaluated for COVID-19. If possible, put on a facemask before emergency medical services arrive. Discontinuing home isolation  Patients with confirmed COVID-19 should remain under home isolation precautions until the risk of secondary transmission to others is thought to be low. The decision to discontinue home isolation precautions should be made on a case-by-case basis, in consultation with healthcare providers and state and local health departments. Thank you for enrolling in [address] Please follow the instructions below to securely access your online medical record. [employer] allows you to send messages to your doctor, view your test results, renew your prescriptions, schedule appointments, and more. How Do I Sign Up? 1. In your Internet browser, go to [email or web address]  2. Click on the Sign Up Now link in the Sign In box. You will see the New Member Sign Up page. 3. Enter your [employer] Access Code exactly as it appears below. You will not need to use this code after youve completed the sign-up process. If you do not sign up before the expiration date, you must request a new code. [employer] Access Code: [email or web address]  Expires: 6/6/2020 12:58 PM    4. Enter your Social Security Number ([id number]) and Date of Birth (mm/dd/yyyy) as indicated and click Submit. You will be taken to the next sign-up page. 5. Create a [facility] ID. This will be your [facility] login ID and cannot be changed, so think of one that is secure and easy to remember. 6. Create a [employer] password. You can change your password at any time. 7. Enter your Password Reset Question and Answer. This can be used at a later time if you forget your password. 8. Enter your e-mail address. You will receive e-mail notification when new information is available in [address] 9. Click Sign Up.  You can now view your

## 2020-04-22 NOTE — PROGRESS NOTES
4/22/2020    FLU/COVID-19 CLINIC EVALUATION    HPI  SYMPTOMS:    Symptom duration, days:  [] 1   [] 2   [] 3   [] 4   [] 5   [] 6   [] 7   [] 8   [] 9   [] 10   [] 11   [] 12   [] 13 [] 14 +      Symptom course:   [] Worsening     [] Stable     [] Improving    [] Fevers  [] Symptom (not measured)  [] Measured (Result: )  [] Chills  [x] Cough  [] Coughing up blood  [] Productive  [] Dry  [] Chest Congestion  [] Chest Tightness  [] Nasal Congestion  [x] Runny Nose  [] Feeling short of breath  [] Fatigue  [] Chest pain  [] Headaches  []Tolerable  [] Severe  [x] Sore throat  [] Muscle aches  [] Nausea  [] Decreased appetite  [] Vomiting  []Unable to keep fluids down  [] Diarrhea  []Severe    [] OTHER SYMPTOMS:      RISK FACTORS:  [] Pregnant or possibly pregnant  [x] Age over 61  [] Diabetes  [] Heart disease  [] Asthma  [] COPD/Other chronic lung diseases  [] Active Cancer  [] On Chemotherapy  [] Taking oral steroids  [] History Lymphoma/Leukemia  [] Close contact with a lab confirmed COVID-19 patient within 14 days of symptom onset  [] History of travel from affected geographical areas within 14 days of symptom onset  [] Health Care Worker Exposure no symptoms  [] Health Care Worker Exposure symptomatic  HTN  AFIB      VITALS:  Vitals:    04/22/20 1316   Pulse: 101   SpO2: 97%        PHYSICAL EXAMINATION:  [x]Alert   []Oriented to person/place/time    [x]No apparent distress     []Toxic appearing    [x]Breathing appears normal     []Appears tachypneic         []Speaking in full sentences     TESTS:  POCT FLU:  [] Positive     []Negative  POCT STREP:  [] Positive     []Negative    [x] COVID-19 Test sent: [x] Blue   [] Red   [] Chest X-ray     ASSESSMENT:  [] Flu  [] Strep Throat  [] Uncertain Viral Respiratory Illness  [] Possible COVID-19  [] Exposure COVID-19  [x] Other:     PLAN:  [x] Discharge home with written instructions for:  [] Flu management  [] Strep throat management  [] Possible COVID-19 exposure with

## 2020-04-24 ENCOUNTER — TELEPHONE (OUTPATIENT)
Dept: CARDIOLOGY CLINIC | Age: 80
End: 2020-04-24

## 2020-04-24 LAB
SARS-COV-2: NOT DETECTED
SOURCE: NORMAL

## 2020-04-24 NOTE — TELEPHONE ENCOUNTER
Seen at [facility] 4/22/2020  Covid test result pending  PCP Dr. Cesilia Hardy ()    I spoke to Mr. Borden. He feels pretty well. He agrees to call his PCP for a follow up. I advised him to call his PCP if symptoms recur.

## 2020-06-15 ENCOUNTER — TELEPHONE (OUTPATIENT)
Dept: CARDIOLOGY CLINIC | Age: 80
End: 2020-06-15

## 2020-06-15 NOTE — TELEPHONE ENCOUNTER
Phone visit scheduled with Dayday Blum and request fot labwork sent to [place]. Pt made aware.

## 2020-06-15 NOTE — TELEPHONE ENCOUNTER
Pt called in stated he had some blood work done at the [facility]. Delia Eastman Dr now would like to change pt medication apixaban (ELIQUIS) 5 MG TABS tablet. Pt would like to speak with Dr. Yelena Ramos to address this matter and see what input Dr. Yelena Ramos has on changing pt medication. Pt can be reached at [phone].  Thanks

## 2020-06-26 ENCOUNTER — VIRTUAL VISIT (OUTPATIENT)
Dept: CARDIOLOGY CLINIC | Age: 80
End: 2020-06-26
Payer: MEDICARE

## 2020-06-26 PROCEDURE — 99442 PR PHYS/QHP TELEPHONE EVALUATION 11-20 MIN: CPT | Performed by: INTERNAL MEDICINE

## 2020-06-26 NOTE — PROGRESS NOTES
Klarissa Joshua is a 79 y.o. male evaluated via telephone on 6/26/2020. Consent:  He and/or health care decision maker is aware that that he may receive a bill for this telephone service, depending on his insurance coverage, and has provided verbal consent to proceed: Yes      Documentation:  I communicated with the patient and/or health care decision maker about see below. Details of this discussion including any medical advice provided: see below      I affirm this is a Patient Initiated Episode with a Patient who has not had a related appointment within my department in the past 7 days or scheduled within the next 24 hours. Patient identification was verified at the start of the visit: Yes    Total Time: minutes: 11-20 minutes    Note: not billable if this call serves to triage the patient into an appointment for the relevant concern      Marivel Banda     79 y.o. man here for f/u. Had PCI to his RCA in 1/2019, to come back for PCI of his LCx but had a large GI bleed (3U transfusion) after his RCA PCI. Has a fib and was on triple therapy at time of bleed. Scoped by GI for melana had diverticulosis. No intervention. Feeling well. Staying. No cp. No sob. No n/v/LH/dizziness. No brbpr. Has had constipation at times. No LE edema. No bleeding issues. Generally feels good. Past Medical History:   Diagnosis Date    Abnormal EKG     Arthritis     Atrial fibrillation (HCC)     CAD S/P percutaneous coronary angioplasty 01/2019    RCA 3.5 x 12 mm Xience (2 overlapping stents).      HLD (hyperlipidemia)     Hypertension     Osteoarthritis     Pure hypercholesterolemia      Past Surgical History:   Procedure Laterality Date    CARDIOVERSION  1/9/15    COLONOSCOPY N/A 2/9/2019    COLONOSCOPY DIAGNOSTIC performed by Radha Clancy MD at [address] [facility] N/A 2/9/2019    EGD DIAGNOSTIC ONLY performed by Radha Clancy MD at [address] [facility] History     Tobacco Use    Smoking status: Never Smoker    Smokeless tobacco: Never Used   Substance Use Topics    Alcohol use: No    Drug use: No     Allergies   Allergen Reactions    Morphine Nausea And Vomiting     Family History   Problem Relation Age of Onset    Heart Failure Brother      Review of Systems   General: No fevers, chills, fatigue, or night sweats. No abnormal changes in weight. HEENT: No blurry or decreased vision. No changes in hearing, nasal discharge or sore throat. Cardiovascular: See HPI. No cramping in legs or buttocks when walking. Respiratory: No cough, hemoptysis, or wheezing. No history of asthma. Gastrointestinal: No abdominal pain, hematochezia, melana, or history of GI ulcers. Genito-Urinary: No dysuria or hematuria. No urgency or polyuria. Musculoskeletal: No complaints of joint pain, joint swelling or muscular weakness/soreness. Neurological: No dizziness or headaches. No numbness/tingling, speech problems or weakness. No history of a stroke or TIA. Psychological: No anxiety or depression  Hematological and Lymphatic: No abnormal bleeding or bruising, blood clots, jaundice. Endocrine: No malaise/lethargy, palpitations, polydipsia/polyuria, temperature intolerance or unexpected weight changes. Skin: No rashes or non-healing ulcers.     PE:  Not obtainable    Lab Results   Component Value Date    WBC 5.3 12/19/2019    HGB 12.0 (L) 12/19/2019    HCT 37.3 (L) 12/19/2019    .9 (H) 12/19/2019     12/19/2019     Lab Results   Component Value Date     04/12/2019    K 4.2 04/12/2019     04/12/2019    CO2 27 04/12/2019    BUN 19 04/12/2019    CREATININE 1.3 04/12/2019    GLUCOSE 88 04/12/2019    CALCIUM 9.3 04/12/2019    PROT 7.7 03/11/2019    LABALBU 4.2 03/11/2019    BILITOT 0.5 03/11/2019    ALKPHOS 91 03/11/2019    AST 17 03/11/2019    ALT 13 03/11/2019    LABGLOM 53 (A) 04/12/2019    GFRAA >60 04/12/2019    AGRATIO 1.2 03/11/2019 GLOB 3.5 03/11/2019     Lab Results   Component Value Date    INR 1.39 (H) 02/08/2019    INR 1.15 (H) 01/29/2019    INR 1.50 (H) 06/26/2018    PROTIME 15.8 (H) 02/08/2019    PROTIME 13.1 (H) 01/29/2019    PROTIME 17.1 (H) 06/26/2018     Lab Results   Component Value Date    CHOL 86 03/01/2019    CHOL 125 01/30/2019    CHOL 195 03/17/2014     Lab Results   Component Value Date    TRIG 48 03/01/2019    TRIG 54 01/30/2019    TRIG 63 03/17/2014     Lab Results   Component Value Date    HDL 44 03/01/2019    HDL 41 01/30/2019    HDL 60 03/17/2014     Lab Results   Component Value Date    LDLCALC 32 03/01/2019    LDLCALC 73 01/30/2019    LDLCALC 122 (H) 03/17/2014     Lab Results   Component Value Date    LABVLDL 10 03/01/2019    LABVLDL 11 01/30/2019    LABVLDL 13 03/17/2014     No results found for: CHOLHDLRATIO    1/29/2019 Cath: Angiographic Findings: Co-dominant vessel  Left Main:  Minimal irregularities. Left Anterior Descending:  Mid 20% stenosis. 80% stenosis in the distal LAD prior to wrapping around the apex. Circumflex:  OM1 is very high and has an ostial/proximal 70% stenosis. OM4 and OM3 each ove 80% lesions. OM4 is a larger artery. Right Coronary:  Mid 20%. Distal 90%. This was stented with Two 3.5 x 12 mm Xience FELIX  Left Ventriculogram:  Not performed. 11/2018 Nuc Stress:  1. Mild reversible perfusion anterior wall, suggestive of ischemia. No fixed defects are identified   2. Left ventricular ejection fraction of greater than 65%, within normal limits.          Current Outpatient Medications:     spironolactone (ALDACTONE) 25 MG tablet, Take 25 mg by mouth daily, Disp: , Rfl:     allopurinol (ZYLOPRIM) 100 MG tablet, Take 100 mg by mouth daily, Disp: , Rfl:     metoprolol succinate (TOPROL XL) 200 MG extended release tablet, Take 1 tablet by mouth daily, Disp: 90 tablet, Rfl: 3    furosemide (LASIX) 20 MG tablet, Take 1 tablet by mouth daily, Disp: 90 tablet, Rfl: 3    pantoprazole (PROTONIX) 40 MG tablet, Take 1 tablet by mouth every morning (before breakfast), Disp: 90 tablet, Rfl: 3    atorvastatin (LIPITOR) 40 MG tablet, Take 1 tablet by mouth daily, Disp: 90 tablet, Rfl: 3    aspirin 81 MG chewable tablet, Take 1 tablet by mouth daily, Disp: 90 tablet, Rfl: 3    ergocalciferol (ERGOCALCIFEROL) 12090 units capsule, Take 1 capsule by mouth once a week, Disp: 12 capsule, Rfl: 3    apixaban (ELIQUIS) 5 MG TABS tablet, Take 1 tablet by mouth 2 times daily, Disp: 180 tablet, Rfl: 1    lisinopril (PRINIVIL;ZESTRIL) 40 MG tablet, Take 1 tablet by mouth daily, Disp: 90 tablet, Rfl: 3    ferrous sulfate 325 (65 Fe) MG tablet, Take 1 tablet by mouth daily (with breakfast), Disp: 30 tablet, Rfl: 0    sildenafil (VIAGRA) 50 MG tablet, Take 50 mg by mouth as needed. , Disp: , Rfl:     A/P:  79 y.o. here for f/u on CAD. 1. Coronary artery disease involving native coronary artery of native heart without angina pectoris    2. Essential hypertension    3. Persistent atrial fibrillation    4. Mixed hyperlipidemia        Recs:  -Cont bb and ace  -Cont statin  -Cont asa and eliquis (2.5 bid)  -Cont aldactone  -F/U 6 mo  -Gets meds and labs at the [facility].  Antonia Call MD, [facility], [facility]

## 2020-08-14 ENCOUNTER — HOSPITAL ENCOUNTER (OUTPATIENT)
Dept: NON INVASIVE DIAGNOSTICS | Age: 80
Discharge: HOME | End: 2020-08-14
Payer: OTHER GOVERNMENT

## 2020-08-14 LAB
LV EF: 63 %
LVEF MODALITY: NORMAL

## 2020-08-14 PROCEDURE — 93356 MYOCRD STRAIN IMG SPCKL TRCK: CPT

## 2020-08-14 PROCEDURE — 93306 TTE W/DOPPLER COMPLETE: CPT

## 2020-08-17 ENCOUNTER — TELEPHONE (OUTPATIENT)
Dept: CARDIOLOGY CLINIC | Age: 80
End: 2020-08-17

## 2020-08-17 NOTE — TELEPHONE ENCOUNTER
Pt called in stated he had a echo test done on Friday 8-14-20. Pt would like to speak to someone to receive results. Pt can be reached at [phone].  Thanks

## 2020-08-18 NOTE — TELEPHONE ENCOUNTER
PT has a thick left ventricle. Normal LVEF. Echo looks good/stable. Normal left ventricular size. Severe concentric left ventricular   hypertrophy. Normal left ventricular systolic function with an estimated   ejection fraction of 60-65%. No regional wall motion abnormalities are   noted. Indeterminate diastolic function. Biatrial enlargement. Aortic valve appears sclerotic but opens adequately. The aortic root is dilated measuring 4.1 cm. Mild mitral annular calcium. Mild tricuspid regurgitation. Trace pericardial effusion.

## 2020-09-15 ENCOUNTER — TELEPHONE (OUTPATIENT)
Dept: CARDIOLOGY CLINIC | Age: 80
End: 2020-09-15

## 2020-09-15 NOTE — TELEPHONE ENCOUNTER
LVM asking pt to call back to discuss. MPI in 11/2018 was abnormal.  Pt had FELIX placed to RCA on 1/29/20.

## 2020-09-15 NOTE — TELEPHONE ENCOUNTER
The patient called asking to speak with Lona Dawn NP. The patient states he would like to go over his stress test from 11/06/18. Please call the patient back at [phone].

## 2020-09-16 NOTE — TELEPHONE ENCOUNTER
Spoke with pt and explained the prior info. He was calling as he has been experiencing BLUM with moderate activity and SOB, CP, dizziness with heavy exertion. This has been going on for the past 6 months. Scheduled OV with CG this coming Monday.

## 2020-09-21 ENCOUNTER — OFFICE VISIT (OUTPATIENT)
Dept: CARDIOLOGY CLINIC | Age: 80
End: 2020-09-21
Payer: OTHER GOVERNMENT

## 2020-09-21 VITALS
SYSTOLIC BLOOD PRESSURE: 134 MMHG | HEIGHT: 74 IN | WEIGHT: 222 LBS | TEMPERATURE: 97.3 F | BODY MASS INDEX: 28.49 KG/M2 | DIASTOLIC BLOOD PRESSURE: 78 MMHG | HEART RATE: 88 BPM

## 2020-09-21 PROCEDURE — 99214 OFFICE O/P EST MOD 30 MIN: CPT | Performed by: NURSE PRACTITIONER

## 2020-09-21 NOTE — Clinical Note
Dr Nirmal Rivera,   Pt has known CAD with LAD, LCX and RCA disease. PCI of RCA jan 2019. He is having chest pain and sob (similar to prior PCI). I ordered stress test but do you want [facility] instead?

## 2020-09-21 NOTE — PROGRESS NOTES
did respond to fluids and dopamine. Hemodynamics (mm Hg):  Left Ventricular Pressure:  94/7, 18  Central Aortic Pressure:  93/57 (73)     1/29/2019 ECG: A fibnb     11/2018 Nuc stress:      1. Mild reversible perfusion anterior wall, suggestive of ischemia. No fixed defects are identified   2. Left ventricular ejection fraction of greater than 65%, within normal limits. Medications:   Current Outpatient Medications   Medication Sig Dispense Refill    apixaban (ELIQUIS) 5 MG TABS tablet Take 0.5 tablets by mouth 2 times daily 180 tablet 1    spironolactone (ALDACTONE) 25 MG tablet Take 25 mg by mouth daily      allopurinol (ZYLOPRIM) 100 MG tablet Take 100 mg by mouth daily      metoprolol succinate (TOPROL XL) 200 MG extended release tablet Take 1 tablet by mouth daily 90 tablet 3    furosemide (LASIX) 20 MG tablet Take 1 tablet by mouth daily 90 tablet 3    pantoprazole (PROTONIX) 40 MG tablet Take 1 tablet by mouth every morning (before breakfast) 90 tablet 3    atorvastatin (LIPITOR) 40 MG tablet Take 1 tablet by mouth daily 90 tablet 3    aspirin 81 MG chewable tablet Take 1 tablet by mouth daily 90 tablet 3    ergocalciferol (ERGOCALCIFEROL) 77626 units capsule Take 1 capsule by mouth once a week 12 capsule 3    lisinopril (PRINIVIL;ZESTRIL) 40 MG tablet Take 1 tablet by mouth daily 90 tablet 3    ferrous sulfate 325 (65 Fe) MG tablet Take 1 tablet by mouth daily (with breakfast) 30 tablet 0    sildenafil (VIAGRA) 50 MG tablet Take 50 mg by mouth as needed. No current facility-administered medications for this visit. Assessment:  1. BLUM (dyspnea on exertion)    2. Mixed hyperlipidemia    3. Unstable angina pectoris ([address]    4. CAD in native artery    5. S/P PTCA (percutaneous transluminal coronary angioplasty)    6. Essential hypertension    7.  Persistent atrial fibrillation          Plan:  Unstable angina/BLUM   Key nuc stress test vs Angiogram. Will discuss with  Ching  CAD/PTCA   ASA   Toprol   Lisinopril   Liptor  HTN   134/78   Lisinopril  HLD   Lipitor   A fib   Eliquis and toprol     Cont spironolactone and lasix   Folllow up after testing

## 2020-09-22 ENCOUNTER — PREP FOR PROCEDURE (OUTPATIENT)
Dept: CARDIOLOGY CLINIC | Age: 80
End: 2020-09-22

## 2020-09-22 RX ORDER — SODIUM CHLORIDE 0.9 % (FLUSH) 0.9 %
10 SYRINGE (ML) INJECTION PRN
Status: CANCELLED | OUTPATIENT
Start: 2020-09-22

## 2020-09-22 RX ORDER — SODIUM CHLORIDE 9 MG/ML
INJECTION, SOLUTION INTRAVENOUS CONTINUOUS
Status: CANCELLED | OUTPATIENT
Start: 2020-09-22

## 2020-09-22 RX ORDER — CLOPIDOGREL BISULFATE 75 MG/1
75 TABLET ORAL DAILY
Qty: 30 TABLET | Refills: 0 | Status: SHIPPED | OUTPATIENT
Start: 2020-09-22 | End: 2021-08-24

## 2020-09-22 RX ORDER — ASPIRIN 325 MG
325 TABLET ORAL ONCE
Status: CANCELLED | OUTPATIENT
Start: 2020-10-01

## 2020-09-22 RX ORDER — SODIUM CHLORIDE 0.9 % (FLUSH) 0.9 %
10 SYRINGE (ML) INJECTION EVERY 12 HOURS SCHEDULED
Status: CANCELLED | OUTPATIENT
Start: 2020-09-22

## 2020-11-03 PROBLEM — I48.91 AF (ATRIAL FIBRILLATION) (HCC): Status: RESOLVED | Noted: 2020-11-03 | Resolved: 2020-11-03

## 2021-07-19 ENCOUNTER — TELEPHONE (OUTPATIENT)
Dept: CARDIOLOGY CLINIC | Age: 81
End: 2021-07-19

## 2021-07-19 NOTE — TELEPHONE ENCOUNTER
CARDIAC RISK ASSESSMENT      What type of procedure are you having? Medial Branch Block injection L3, L4, L5    Which physician is performing your procedure? Dr. Linn Delgado    When is your procedure scheduled for?    7/22/21    Where are you having this procedure? [facility]     Are you taking Blood Thinners? Yes. If so what? (Name/dose/frequesncy)    Eliquis 5 mg daily    Does the surgeon want you to stop your blood thinner?  Yes    If so for how long? 2 days or per Cardiologist    Phone Number and Contact Name for Physicians office:    [place]/Dr. Linn Delgado  [phone], option 1     Fax number to send information:    [phone]

## 2021-07-20 NOTE — TELEPHONE ENCOUNTER
Unable to fit patient in today. Patient was scheduled for a cath on 10/1/21 and did not show up. Patient needs to follow up with Dr. Ester Asencio.

## 2021-07-20 NOTE — TELEPHONE ENCOUNTER
Called and left Martín Smiling a message. Pranav Hatchet said pt can stop Eliquis 5mg 48 before. He CANNOT stop his ASA. We are showing that pt still takes Plavix and that needs stops 5 days in advance.  Left office number for Martín Smijuan to call back with any questions

## 2021-08-20 ENCOUNTER — TELEPHONE (OUTPATIENT)
Dept: CARDIOLOGY CLINIC | Age: 81
End: 2021-08-20

## 2021-08-20 NOTE — TELEPHONE ENCOUNTER
Pt should already be on Eliquis 2.5 bid. That dose doesnt go any lower.  He should continue Eliquis 2.5 bid

## 2021-08-20 NOTE — TELEPHONE ENCOUNTER
The patient called and would like to know if he can decrease his Eliquis dose, as recommended by the [place]. Please call the patient back at [phone].

## 2021-08-24 ENCOUNTER — OFFICE VISIT (OUTPATIENT)
Dept: CARDIOLOGY CLINIC | Age: 81
End: 2021-08-24
Payer: MEDICARE

## 2021-08-24 VITALS
OXYGEN SATURATION: 99 % | SYSTOLIC BLOOD PRESSURE: 118 MMHG | HEART RATE: 84 BPM | HEIGHT: 74 IN | DIASTOLIC BLOOD PRESSURE: 76 MMHG | WEIGHT: 220.6 LBS | BODY MASS INDEX: 28.31 KG/M2

## 2021-08-24 DIAGNOSIS — E78.2 MIXED HYPERLIPIDEMIA: ICD-10-CM

## 2021-08-24 DIAGNOSIS — Z79.899 MEDICATION MANAGEMENT: Primary | ICD-10-CM

## 2021-08-24 DIAGNOSIS — Z98.61 HISTORY OF PTCA: ICD-10-CM

## 2021-08-24 DIAGNOSIS — I25.10 CORONARY ARTERY DISEASE INVOLVING NATIVE CORONARY ARTERY OF NATIVE HEART WITHOUT ANGINA PECTORIS: ICD-10-CM

## 2021-08-24 DIAGNOSIS — I48.19 PERSISTENT ATRIAL FIBRILLATION (HCC): ICD-10-CM

## 2021-08-24 DIAGNOSIS — I10 ESSENTIAL HYPERTENSION: ICD-10-CM

## 2021-08-24 PROCEDURE — 99214 OFFICE O/P EST MOD 30 MIN: CPT | Performed by: NURSE PRACTITIONER

## 2021-08-24 NOTE — PROGRESS NOTES
CC/HPI:    79 y.o. patient of Dr. Nadege Lester tih CAD, persistent A fib, HTN and HLD. Here to discuss eliquis dose. [facility] Eliquis 5 mg instead of 2.5 mg bid. He denies cp, sob, Lh/dizziness, palpitations or syncope. No LE edema, orthopnea or PND. Uses CPAP at night. No n/v/d, fever, chills, or GI/Gu bleeding. Past Medical History:   Diagnosis Date    Abnormal EKG     Arthritis     Atrial fibrillation (HCC)     CAD S/P percutaneous coronary angioplasty 01/2019    RCA 3.5 x 12 mm Xience (2 overlapping stents).  HLD (hyperlipidemia)     Hypertension     Osteoarthritis     Pure hypercholesterolemia      Past Surgical History:   Procedure Laterality Date    CARDIOVERSION  1/9/15    COLONOSCOPY N/A 2/9/2019    COLONOSCOPY DIAGNOSTIC performed by Heather Abrams MD at [facility] N/A 2/9/2019    EGD DIAGNOSTIC ONLY performed by Heather Abrams MD at [address] [place] [address] [address] ENDOSCOPY     Family History   Problem Relation Age of Onset    Heart Failure Brother      Social History     Tobacco Use    Smoking status: Never Smoker    Smokeless tobacco: Never Used   Substance Use Topics    Alcohol use: No    Drug use: No     Allergies:Morphine    Review of Systems  General: No changes in weight, fatigue, or night sweats. HEENT: No blurry or decreased vision. No changes in hearing, nasal discharge or sore throat. Cardiovascular:  See HPI. Respiratory: No cough, hemoptysis, or wheezing. No history of asthma. Gastrointestinal:  No abdominal pain, hematochezia, melana, constipation, diarrhea. + hx GI bleed  Genito-Urinary: No dysuria or hematuria. No urgency or polyuria. Musculoskeletal: + left hand arthritis. Back pain   Neurological:  No dizziness, headaches, numbness/tingling, speech problems or weakness. No history of a stroke or TIA.   Psychological: + PTSD   Hematological and Lymphatic: No abnormal bleeding or bruising, blood clots, jaundice or swollen lymph nodes.  Endocrine:   No malaise/lethargy, palpitations, polydipsia/polyuria, temperature intolerance or unexpected weight changes  Skin:  No rashes or non-healing ulcers. Physical Exam:  Vitals:    08/24/21 1428   BP: 118/76   Pulse: 84   SpO2: 99%     General (appearance):  No acute distress  Eyes: anicteric   Neck: soft, No JVD  Ears/Nose/Mouth/Thorat: No cyanosis  CV: Irreg, irreg   Respiratory:  clear  GI: soft, non-tender, non-distended  Skin: Warm, dry. No rashes  Neuro/Psych: Alert and oriented x 3. Appropriate behavior  Ext:  No c/c. No edema.    Pulses:  2+ radial     Labs:    CBC:   Lab Results   Component Value Date    WBC 5.3 12/19/2019    HGB 12.0 (L) 12/19/2019    HCT 37.3 (L) 12/19/2019    .9 (H) 12/19/2019     12/19/2019     BMP:  Lab Results   Component Value Date    CREATININE 1.3 04/12/2019    BUN 19 04/12/2019     04/12/2019    K 4.2 04/12/2019     04/12/2019    CO2 27 04/12/2019     Mag:   Lab Results   Component Value Date    MG 2.00 02/08/2019     LIVER PROFILE:   Lab Results   Component Value Date    ALT 13 03/11/2019    AST 17 03/11/2019    ALKPHOS 91 03/11/2019    BILITOT 0.5 03/11/2019     PT/INR:   Lab Results   Component Value Date    INR 1.39 (H) 02/08/2019    INR 1.15 (H) 01/29/2019    INR 1.50 (H) 06/26/2018    PROTIME 15.8 (H) 02/08/2019    PROTIME 13.1 (H) 01/29/2019    PROTIME 17.1 (H) 06/26/2018     BNP:  No results found for: BNP  LIPIDS:  No components found for: CHLPL  Lab Results   Component Value Date    TRIG 48 03/01/2019    TRIG 54 01/30/2019    TRIG 63 03/17/2014     Lab Results   Component Value Date    HDL 44 03/01/2019    HDL 41 01/30/2019    HDL 60 03/17/2014     Lab Results   Component Value Date    LDLCALC 32 03/01/2019    LDLCALC 73 01/30/2019    LDLCALC 122 (H) 03/17/2014     Lab Results   Component Value Date    LABVLDL 10 03/01/2019    LABVLDL 11 01/30/2019    LABVLDL 13 03/17/2014     TSH:  Lab Results   Component Value Date    TSH 1.52 10/03/2012     [facility] labs 2021  Na140  K 3.9    CO2 26  BUN 11  Cr 1.19  CHol 109  Trig 52  HDL 46  LDL 44    [id number] Echo:     Normal left ventricular size. Severe concentric left ventricular   hypertrophy. Normal left ventricular systolic function with an estimated   ejection fraction of 60-65%. No regional wall motion abnormalities are   noted. Indeterminate diastolic function. Biatrial enlargement. Aortic valve appears sclerotic but opens adequately. The aortic root is dilated measuring 4.1 cm. Mild mitral annular calcium. Mild tricuspid regurgitation. Trace pericardial effusion. 2019 EGD/Colonoscopy: Normal. No bleed found. Recs: restart a/c gently      2019 ECG: A fib, non-specific tw abnl    2019 ECG: A fib     2019 Coronary angiogram  Angiographic Findings: Co-dominant vessel  Left Main:  Minimal irregularities. Left Anterior Descending:  Mid 20% stenosis. 80% stenosis in the distal LAD prior to wrapping around the apex. Circumflex:  OM1 is very high and has an ostial/proximal 70% stenosis. OM4 and OM3 each ove 80% lesions. OM4 is a larger artery. Right Coronary:  Mid 20%. Distal 90%  Left Ventriculogram:  Not performed. Intervention:  Anticoagulation with Heparin and aggrastat was used (see nursing notes for details).  A 6 Fr AL 0.75 guide catheter was then advanced over the wire to the ascending aorta.  The wire was removed, and the right artery was engaged. Zoë Cuna was then advanced to the distal right coronary artery.       RCA:  Direct Stent: Two 3.5 x 12 mm Xience FELIX; the overlap area post-dilated to 16 BRYAN     Angiograms were performed post-angioplasty/stent deployment.  STEPHEN 3 flow was present after the intervention, however there was initially slow flow/no reflow. The patient became hypotensive and bradycardic and did respond to fluids and dopamine.    Hemodynamics (mm Hg):  Left Ventricular Pressure:  94/7, 18  Central Aortic Pressure:  93/57 (73)     1/29/2019 ECG: A fibnb     11/2018 Nuc stress:      1. Mild reversible perfusion anterior wall, suggestive of ischemia. No fixed defects are identified   2. Left ventricular ejection fraction of greater than 65%, within normal limits. Medications:   Current Outpatient Medications   Medication Sig Dispense Refill    apixaban (ELIQUIS) 2.5 MG TABS tablet Take 1 tablet by mouth 2 times daily 90 tablet 2    clopidogrel (PLAVIX) 75 MG tablet Take 1 tablet by mouth daily 30 tablet 0    spironolactone (ALDACTONE) 25 MG tablet Take 25 mg by mouth daily      allopurinol (ZYLOPRIM) 100 MG tablet Take 100 mg by mouth daily      metoprolol succinate (TOPROL XL) 200 MG extended release tablet Take 1 tablet by mouth daily 90 tablet 3    furosemide (LASIX) 20 MG tablet Take 1 tablet by mouth daily 90 tablet 3    pantoprazole (PROTONIX) 40 MG tablet Take 1 tablet by mouth every morning (before breakfast) 90 tablet 3    atorvastatin (LIPITOR) 40 MG tablet Take 1 tablet by mouth daily 90 tablet 3    aspirin 81 MG chewable tablet Take 1 tablet by mouth daily 90 tablet 3    ergocalciferol (ERGOCALCIFEROL) 08785 units capsule Take 1 capsule by mouth once a week 12 capsule 3    lisinopril (PRINIVIL;ZESTRIL) 40 MG tablet Take 1 tablet by mouth daily 90 tablet 3    ferrous sulfate 325 (65 Fe) MG tablet Take 1 tablet by mouth daily (with breakfast) 30 tablet 0    sildenafil (VIAGRA) 50 MG tablet Take 50 mg by mouth as needed. No current facility-administered medications for this visit. Assessment:  1. Medication management    2. Coronary artery disease involving native coronary artery of native heart without angina pectoris    3. History of PTCA    4. Essential hypertension    5. Mixed hyperlipidemia    6.  Persistent atrial fibrillation (HCC)        Plan:  Eliquis 5 mg po BID    Age > 80   Cr 1.19    Weight 100 kg  CAD/PTCA   ASA   Toprol   Lisinopril   Liptor  HTN   118/76   Lisinopril  HLD   Lipitor   A fib   Eliquis and toprol     Cont spironolactone and lasix     Reviewed   Echo, EGD/Colonoscopy, ECG, LHC, Nuc   Reviewed BMP and lipids

## 2023-02-06 LAB — DIGOXIN LEVEL: 0.9 NG/ML (ref 0.8–2)

## 2023-03-11 ENCOUNTER — APPOINTMENT (OUTPATIENT)
Dept: GENERAL RADIOLOGY | Age: 83
DRG: 812 | End: 2023-03-11
Payer: OTHER GOVERNMENT

## 2023-03-11 ENCOUNTER — APPOINTMENT (OUTPATIENT)
Dept: CT IMAGING | Age: 83
DRG: 812 | End: 2023-03-11
Payer: OTHER GOVERNMENT

## 2023-03-11 ENCOUNTER — HOSPITAL ENCOUNTER (INPATIENT)
Age: 83
LOS: 2 days | Discharge: HOME | DRG: 812 | End: 2023-03-13
Attending: EMERGENCY MEDICINE | Admitting: INTERNAL MEDICINE
Payer: OTHER GOVERNMENT

## 2023-03-11 DIAGNOSIS — D64.9 ANEMIA, UNSPECIFIED TYPE: Primary | ICD-10-CM

## 2023-03-11 LAB
A/G RATIO: 1.1 (ref 1.1–2.2)
ABO/RH: NORMAL
ALBUMIN SERPL-MCNC: 3.5 G/DL (ref 3.4–5)
ALP BLD-CCNC: 65 U/L (ref 40–129)
ALT SERPL-CCNC: 16 U/L (ref 10–40)
ANION GAP SERPL CALCULATED.3IONS-SCNC: 11 MMOL/L (ref 3–16)
ANION GAP SERPL CALCULATED.3IONS-SCNC: 15 MMOL/L (ref 3–16)
ANTIBODY SCREEN: NORMAL
APTT: 33.5 SEC (ref 23–34.3)
AST SERPL-CCNC: 77 U/L (ref 15–37)
BASOPHILS ABSOLUTE: 0 K/UL (ref 0–0.2)
BASOPHILS RELATIVE PERCENT: 0.4 %
BILIRUB SERPL-MCNC: 0.7 MG/DL (ref 0–1)
BLOOD BANK DISPENSE STATUS: NORMAL
BLOOD BANK PRODUCT CODE: NORMAL
BPU ID: NORMAL
BUN BLDV-MCNC: 17 MG/DL (ref 7–20)
BUN BLDV-MCNC: 17 MG/DL (ref 7–20)
CALCIUM SERPL-MCNC: 8.8 MG/DL (ref 8.3–10.6)
CALCIUM SERPL-MCNC: 9.2 MG/DL (ref 8.3–10.6)
CHLORIDE BLD-SCNC: 105 MMOL/L (ref 99–110)
CHLORIDE BLD-SCNC: 106 MMOL/L (ref 99–110)
CO2: 20 MMOL/L (ref 21–32)
CO2: 23 MMOL/L (ref 21–32)
CREAT SERPL-MCNC: 1.4 MG/DL (ref 0.8–1.3)
CREAT SERPL-MCNC: 1.4 MG/DL (ref 0.8–1.3)
DESCRIPTION BLOOD BANK: NORMAL
DIGOXIN LEVEL: 0.8 NG/ML (ref 0.8–2)
EOSINOPHILS ABSOLUTE: 0 K/UL (ref 0–0.6)
EOSINOPHILS RELATIVE PERCENT: 0.2 %
GFR SERPL CREATININE-BSD FRML MDRD: 50 ML/MIN/{1.73_M2}
GFR SERPL CREATININE-BSD FRML MDRD: 50 ML/MIN/{1.73_M2}
GLUCOSE BLD-MCNC: 118 MG/DL (ref 70–99)
GLUCOSE BLD-MCNC: 168 MG/DL (ref 70–99)
GLUCOSE BLD-MCNC: 182 MG/DL (ref 70–99)
HCT VFR BLD CALC: 20.1 % (ref 40.5–52.5)
HCT VFR BLD CALC: 24 % (ref 40.5–52.5)
HEMOGLOBIN: 6 G/DL (ref 13.5–17.5)
HEMOGLOBIN: 7.1 G/DL (ref 13.5–17.5)
IMMATURE RETIC FRACT: 0.58 (ref 0.21–0.37)
INR BLD: 1.79 (ref 0.87–1.14)
LACTATE DEHYDROGENASE: 239 U/L (ref 100–190)
LIPASE: 20 U/L (ref 13–60)
LYMPHOCYTES ABSOLUTE: 0.4 K/UL (ref 1–5.1)
LYMPHOCYTES RELATIVE PERCENT: 6.1 %
MCH RBC QN AUTO: 20.8 PG (ref 26–34)
MCHC RBC AUTO-ENTMCNC: 29.7 G/DL (ref 31–36)
MCV RBC AUTO: 70 FL (ref 80–100)
MONOCYTES ABSOLUTE: 0.5 K/UL (ref 0–1.3)
MONOCYTES RELATIVE PERCENT: 7.6 %
NEUTROPHILS ABSOLUTE: 6.2 K/UL (ref 1.7–7.7)
NEUTROPHILS RELATIVE PERCENT: 85.7 %
PDW BLD-RTO: 18.9 % (ref 12.4–15.4)
PERFORMED ON: ABNORMAL
PLATELET # BLD: 325 K/UL (ref 135–450)
PMV BLD AUTO: 9.1 FL (ref 5–10.5)
POTASSIUM REFLEX MAGNESIUM: 5.9 MMOL/L (ref 3.5–5.1)
POTASSIUM SERPL-SCNC: 4.5 MMOL/L (ref 3.5–5.1)
PRO-BNP: 3758 PG/ML (ref 0–449)
PROTHROMBIN TIME: 20.8 SEC (ref 11.7–14.5)
RBC # BLD: 2.87 M/UL (ref 4.2–5.9)
RETICULOCYTE ABSOLUTE COUNT: 0.08 M/UL
RETICULOCYTE COUNT PCT: 2.54 % (ref 0.5–2.18)
SODIUM BLD-SCNC: 139 MMOL/L (ref 136–145)
SODIUM BLD-SCNC: 141 MMOL/L (ref 136–145)
TOTAL PROTEIN: 6.6 G/DL (ref 6.4–8.2)
TROPONIN: <0.01 NG/ML
WBC # BLD: 7.2 K/UL (ref 4–11)

## 2023-03-11 PROCEDURE — 71046 X-RAY EXAM CHEST 2 VIEWS: CPT

## 2023-03-11 PROCEDURE — 83540 ASSAY OF IRON: CPT

## 2023-03-11 PROCEDURE — 2580000003 HC RX 258: Performed by: INTERNAL MEDICINE

## 2023-03-11 PROCEDURE — 74177 CT ABD & PELVIS W/CONTRAST: CPT

## 2023-03-11 PROCEDURE — 85610 PROTHROMBIN TIME: CPT

## 2023-03-11 PROCEDURE — 99285 EMERGENCY DEPT VISIT HI MDM: CPT

## 2023-03-11 PROCEDURE — 86901 BLOOD TYPING SEROLOGIC RH(D): CPT

## 2023-03-11 PROCEDURE — 6360000004 HC RX CONTRAST MEDICATION: Performed by: INTERNAL MEDICINE

## 2023-03-11 PROCEDURE — 83615 LACTATE (LD) (LDH) ENZYME: CPT

## 2023-03-11 PROCEDURE — P9016 RBC LEUKOCYTES REDUCED: HCPCS

## 2023-03-11 PROCEDURE — 85014 HEMATOCRIT: CPT

## 2023-03-11 PROCEDURE — 84484 ASSAY OF TROPONIN QUANT: CPT

## 2023-03-11 PROCEDURE — 86900 BLOOD TYPING SEROLOGIC ABO: CPT

## 2023-03-11 PROCEDURE — 83010 ASSAY OF HAPTOGLOBIN QUANT: CPT

## 2023-03-11 PROCEDURE — 6360000002 HC RX W HCPCS: Performed by: EMERGENCY MEDICINE

## 2023-03-11 PROCEDURE — 83550 IRON BINDING TEST: CPT

## 2023-03-11 PROCEDURE — 1200000000 HC SEMI PRIVATE

## 2023-03-11 PROCEDURE — 83880 ASSAY OF NATRIURETIC PEPTIDE: CPT

## 2023-03-11 PROCEDURE — 80162 ASSAY OF DIGOXIN TOTAL: CPT

## 2023-03-11 PROCEDURE — C9113 INJ PANTOPRAZOLE SODIUM, VIA: HCPCS | Performed by: INTERNAL MEDICINE

## 2023-03-11 PROCEDURE — 82607 VITAMIN B-12: CPT

## 2023-03-11 PROCEDURE — 85045 AUTOMATED RETICULOCYTE COUNT: CPT

## 2023-03-11 PROCEDURE — P9040 RBC LEUKOREDUCED IRRADIATED: HCPCS

## 2023-03-11 PROCEDURE — 85018 HEMOGLOBIN: CPT

## 2023-03-11 PROCEDURE — 83690 ASSAY OF LIPASE: CPT

## 2023-03-11 PROCEDURE — 36415 COLL VENOUS BLD VENIPUNCTURE: CPT

## 2023-03-11 PROCEDURE — 6360000002 HC RX W HCPCS: Performed by: INTERNAL MEDICINE

## 2023-03-11 PROCEDURE — 85025 COMPLETE CBC W/AUTO DIFF WBC: CPT

## 2023-03-11 PROCEDURE — 93005 ELECTROCARDIOGRAM TRACING: CPT | Performed by: EMERGENCY MEDICINE

## 2023-03-11 PROCEDURE — 96374 THER/PROPH/DIAG INJ IV PUSH: CPT

## 2023-03-11 PROCEDURE — 82746 ASSAY OF FOLIC ACID SERUM: CPT

## 2023-03-11 PROCEDURE — 86850 RBC ANTIBODY SCREEN: CPT

## 2023-03-11 PROCEDURE — 85730 THROMBOPLASTIN TIME PARTIAL: CPT

## 2023-03-11 PROCEDURE — 86923 COMPATIBILITY TEST ELECTRIC: CPT

## 2023-03-11 PROCEDURE — 80053 COMPREHEN METABOLIC PANEL: CPT

## 2023-03-11 RX ORDER — ONDANSETRON 2 MG/ML
4 INJECTION INTRAMUSCULAR; INTRAVENOUS ONCE
Status: COMPLETED | OUTPATIENT
Start: 2023-03-11 | End: 2023-03-11

## 2023-03-11 RX ORDER — ONDANSETRON 2 MG/ML
4 INJECTION INTRAMUSCULAR; INTRAVENOUS EVERY 6 HOURS PRN
Status: DISCONTINUED | OUTPATIENT
Start: 2023-03-11 | End: 2023-03-13 | Stop reason: HOSPADM

## 2023-03-11 RX ORDER — METOPROLOL SUCCINATE 100 MG/1
200 TABLET, EXTENDED RELEASE ORAL DAILY
Status: DISCONTINUED | OUTPATIENT
Start: 2023-03-12 | End: 2023-03-13 | Stop reason: HOSPADM

## 2023-03-11 RX ORDER — SODIUM CHLORIDE 9 MG/ML
INJECTION, SOLUTION INTRAVENOUS PRN
Status: DISCONTINUED | OUTPATIENT
Start: 2023-03-11 | End: 2023-03-13 | Stop reason: HOSPADM

## 2023-03-11 RX ORDER — PANTOPRAZOLE SODIUM 40 MG/10ML
40 INJECTION, POWDER, LYOPHILIZED, FOR SOLUTION INTRAVENOUS 2 TIMES DAILY
Status: DISCONTINUED | OUTPATIENT
Start: 2023-03-11 | End: 2023-03-13 | Stop reason: HOSPADM

## 2023-03-11 RX ORDER — ONDANSETRON 4 MG/1
4 TABLET, ORALLY DISINTEGRATING ORAL EVERY 8 HOURS PRN
Status: DISCONTINUED | OUTPATIENT
Start: 2023-03-11 | End: 2023-03-13 | Stop reason: HOSPADM

## 2023-03-11 RX ORDER — ATORVASTATIN CALCIUM 80 MG/1
80 TABLET, FILM COATED ORAL DAILY
Status: DISCONTINUED | OUTPATIENT
Start: 2023-03-12 | End: 2023-03-13 | Stop reason: HOSPADM

## 2023-03-11 RX ORDER — ALLOPURINOL 100 MG/1
100 TABLET ORAL DAILY
Status: DISCONTINUED | OUTPATIENT
Start: 2023-03-12 | End: 2023-03-13 | Stop reason: HOSPADM

## 2023-03-11 RX ORDER — POLYETHYLENE GLYCOL 3350 17 G/17G
17 POWDER, FOR SOLUTION ORAL DAILY PRN
Status: DISCONTINUED | OUTPATIENT
Start: 2023-03-11 | End: 2023-03-13 | Stop reason: HOSPADM

## 2023-03-11 RX ORDER — DIGOXIN 125 MCG
125 TABLET ORAL DAILY
COMMUNITY

## 2023-03-11 RX ORDER — SODIUM CHLORIDE 0.9 % (FLUSH) 0.9 %
5-40 SYRINGE (ML) INJECTION EVERY 12 HOURS SCHEDULED
Status: DISCONTINUED | OUTPATIENT
Start: 2023-03-11 | End: 2023-03-13 | Stop reason: HOSPADM

## 2023-03-11 RX ORDER — ACETAMINOPHEN 325 MG/1
650 TABLET ORAL EVERY 6 HOURS PRN
Status: DISCONTINUED | OUTPATIENT
Start: 2023-03-11 | End: 2023-03-13 | Stop reason: HOSPADM

## 2023-03-11 RX ORDER — ATORVASTATIN CALCIUM 80 MG/1
80 TABLET, FILM COATED ORAL DAILY
COMMUNITY

## 2023-03-11 RX ORDER — DIGOXIN 125 MCG
125 TABLET ORAL DAILY
Status: DISCONTINUED | OUTPATIENT
Start: 2023-03-12 | End: 2023-03-13 | Stop reason: HOSPADM

## 2023-03-11 RX ORDER — SODIUM CHLORIDE 0.9 % (FLUSH) 0.9 %
5-40 SYRINGE (ML) INJECTION PRN
Status: DISCONTINUED | OUTPATIENT
Start: 2023-03-11 | End: 2023-03-13 | Stop reason: HOSPADM

## 2023-03-11 RX ORDER — ACETAMINOPHEN 650 MG/1
650 SUPPOSITORY RECTAL EVERY 6 HOURS PRN
Status: DISCONTINUED | OUTPATIENT
Start: 2023-03-11 | End: 2023-03-13 | Stop reason: HOSPADM

## 2023-03-11 RX ORDER — LANOLIN ALCOHOL/MO/W.PET/CERES
325 CREAM (GRAM) TOPICAL EVERY OTHER DAY
COMMUNITY

## 2023-03-11 RX ORDER — ACETAMINOPHEN 500 MG
500 TABLET ORAL EVERY 6 HOURS PRN
COMMUNITY

## 2023-03-11 RX ORDER — PANTOPRAZOLE SODIUM 40 MG/10ML
80 INJECTION, POWDER, LYOPHILIZED, FOR SOLUTION INTRAVENOUS ONCE
Status: DISCONTINUED | OUTPATIENT
Start: 2023-03-11 | End: 2023-03-13 | Stop reason: HOSPADM

## 2023-03-11 RX ORDER — ALBUTEROL SULFATE 2.5 MG/3ML
2.5 SOLUTION RESPIRATORY (INHALATION) 4 TIMES DAILY
Status: DISCONTINUED | OUTPATIENT
Start: 2023-03-11 | End: 2023-03-12

## 2023-03-11 RX ADMIN — PANTOPRAZOLE SODIUM 40 MG: 40 INJECTION, POWDER, FOR SOLUTION INTRAVENOUS at 21:00

## 2023-03-11 RX ADMIN — IOPAMIDOL 75 ML: 755 INJECTION, SOLUTION INTRAVENOUS at 19:44

## 2023-03-11 RX ADMIN — ONDANSETRON 4 MG: 2 INJECTION INTRAMUSCULAR; INTRAVENOUS at 14:11

## 2023-03-11 RX ADMIN — SODIUM CHLORIDE, PRESERVATIVE FREE 10 ML: 5 INJECTION INTRAVENOUS at 21:01

## 2023-03-11 NOTE — ED NOTES
ED TO INPATIENT SBAR HANDOFF    Patient Name: Marta Salazar   :  1940  79 y.o. MRN:  [id number]  Preferred Name  Ed  ED Room #:  D26/Q15-66  Family/Caregiver Present yes   Restraints no   Sitter no   Sepsis Risk Score Sepsis Risk Score: 1.4    Situation  Code Status: Prior No additional code details. Allergies: Morphine  Weight: Patient Vitals for the past 96 hrs (Last 3 readings):   Weight   23 1259 220 lb (99.8 kg)     Arrived from: home  Chief Complaint:   Chief Complaint   Patient presents with    Nausea    Emesis     Hospital Problem/Diagnosis:  Principal Problem:    Anemia  Resolved Problems:    * No resolved hospital problems. *    Imaging:   XR CHEST (2 VW)   Final Result      Cardiomegaly. Mild left basilar opacities, favored to be atelectasis.       CT ABDOMEN PELVIS W IV CONTRAST Additional Contrast? None    (Results Pending)     Abnormal labs:   Abnormal Labs Reviewed   CBC WITH AUTO DIFFERENTIAL - Abnormal; Notable for the following components:       Result Value    RBC 2.87 (*)     Hemoglobin 6.0 (*)     Hematocrit 20.1 (*)     MCV 70.0 (*)     MCH 20.8 (*)     MCHC 29.7 (*)     RDW 18.9 (*)     Lymphocytes Absolute 0.4 (*)     All other components within normal limits    Narrative:     Janneth Devine tel. [id number],  Hematology results called to and read back by Majo Lundberg, 2023  14:39, by Elisabet Fairchild   COMPREHENSIVE METABOLIC PANEL W/ REFLEX TO MG FOR LOW K - Abnormal; Notable for the following components:    Potassium reflex Magnesium 5.9 (*)     CO2 20 (*)     Glucose 168 (*)     Creatinine 1.4 (*)     Est, Glom Filt Rate 50 (*)     AST 77 (*)     All other components within normal limits   BRAIN NATRIURETIC PEPTIDE - Abnormal; Notable for the following components:    Pro-BNP 3,758 (*)     All other components within normal limits   PROTIME-INR - Abnormal; Notable for the following components:    Protime 20.8 (*)     INR 1.79 (*)     All other components within normal limits   BASIC METABOLIC PANEL - Abnormal; Notable for the following components:    Glucose 118 (*)     Creatinine 1.4 (*)     Est, Glom Filt Rate 50 (*)     All other components within normal limits   POCT GLUCOSE - Abnormal; Notable for the following components:    POC Glucose 182 (*)     All other components within normal limits     Critical values: yes  Hgb 6.0    Abnormal Assessment Findings:     Background  History:   Past Medical History:   Diagnosis Date    Abnormal EKG     Arthritis     Atrial fibrillation (HCC)     CAD S/P percutaneous coronary angioplasty 01/2019    RCA 3.5 x 12 mm Xience (2 overlapping stents). HLD (hyperlipidemia)     Hypertension     Osteoarthritis     Pure hypercholesterolemia        Assessment    Vitals/MEWS: MEWS Score: 1  Level of Consciousness: Alert (0)   Vitals:    03/11/23 1712 03/11/23 1715 03/11/23 1730 03/11/23 1800   BP: 126/77 136/67 118/69 (!) 118/59   Pulse: 70 71 70 72   Resp: 18 18 18 18   Temp: 97.6 °F (36.4 °C) 97.6 °F (36.4 °C) 97.7 °F (36.5 °C)    TempSrc: Oral Oral Oral    SpO2: 98% 98% 99% 99%   Weight:       Height:         FiO2 (%):   O2 Flow Rate: O2 Device: None (Room air)    Cardiac Rhythm:    Pain Assessment: yes [x] Verbal [] [place] Scale  Pain Scale:  denies  Last documented pain score (0-10 scale)    Last documented pain medication administered:   Mental Status: oriented and alert  Orientation Level:    NIH Score:    C-SSRS: Risk of Suicide: No Risk  Bedside swallow:    [place] Coma Scale (GCS): [place] Coma Scale  Eye Opening: Spontaneous  Best Verbal Response: Oriented  Best Motor Response: Obeys commands  [place] Coma Scale Score: 15  Active LDA's:   Peripheral IV 03/11/23 Right Hand (Active)   Site Assessment Clean, dry & intact 03/11/23 1331   Line Status Blood return noted;Normal saline locked; Flushed 03/11/23 1331   Phlebitis Assessment No symptoms 03/11/23 1331   Infiltration Assessment 0 03/11/23 1331   Dressing Type Transparent 03/11/23 1331     PO Status: Regular  Pertinent or High Risk Medications/Drips: no   If Yes, please provide details:   Pending Blood Product Administration: Iransfusing 1 unit       You may also review the ED PT Care Timeline found under the Summary Nursing Index tab. Recommendation    Pending orders   Plan for Discharge (if known):    Additional Comments:    If any further questions, please call Sending RN at [phone]    Electronically signed by: Electronically signed by Roxi Britt RN on 3/11/2023 at 6:33 PM           Roxi Britt RN  03/11/23 [address] [facility] [address]

## 2023-03-11 NOTE — ED PROVIDER NOTES
[address] [facility] 71 ENCOUNTER          ATTENDING PHYSICIAN NOTE       Date of evaluation: 3/11/2023    Chief Complaint     Nausea and Emesis      History of Present Illness     Angel Luis Kilpatrick is a 79 y.o. male with a past medical history of A-fib on digoxin and Eliquis, hypertension, hyperlipidemia who presents to the emergency department today with nausea, vomiting, fatigue. This is been ongoing for the past couple of weeks. The nausea started today. He is not sure what may have caused it. Prior to this, he was eating and drinking normally. He is voiding normally and having normal bowel movements. Denies any dark stool. He was recently started on iron for what they think is iron deficiency anemia. He does have some shortness of breath on exertion that improves when he rests. He is also noted some swelling in his bilateral lower extremities. He currently denies any other complaints. No headache, vision changes, chest pain, abdominal pain, constipation, diarrhea, urinary symptoms. Review of Systems     As documented in HPI, otherwise all other systems were reviewed and negative    Past Medical, Surgical, Family, and Social History     He has a past medical history of Abnormal EKG, Arthritis, Atrial fibrillation ([address] CAD S/P percutaneous coronary angioplasty, HLD (hyperlipidemia), Hypertension, Osteoarthritis, and Pure hypercholesterolemia. He has a past surgical history that includes Cardioversion (1/9/15); Upper gastrointestinal endoscopy (N/A, 2/9/2019); and Colonoscopy (N/A, 2/9/2019). His family history includes Heart Failure in his brother. He reports that he has never smoked. He has never used smokeless tobacco. He reports that he does not drink alcohol and does not use drugs.     Medications     Previous Medications    ALLOPURINOL (ZYLOPRIM) 100 MG TABLET    Take 100 mg by mouth daily    APIXABAN (ELIQUIS) 2.5 MG TABS TABLET    Take 1 tablet by mouth 2 times daily ASPIRIN 81 MG CHEWABLE TABLET    Take 1 tablet by mouth daily    ATORVASTATIN (LIPITOR) 40 MG TABLET    Take 1 tablet by mouth daily    ERGOCALCIFEROL (ERGOCALCIFEROL) 19931 UNITS CAPSULE    Take 1 capsule by mouth once a week    FERROUS SULFATE 325 (65 FE) MG TABLET    Take 1 tablet by mouth daily (with breakfast)    FUROSEMIDE (LASIX) 20 MG TABLET    Take 1 tablet by mouth daily    LISINOPRIL (PRINIVIL;ZESTRIL) 40 MG TABLET    Take 1 tablet by mouth daily    METOPROLOL SUCCINATE (TOPROL XL) 200 MG EXTENDED RELEASE TABLET    Take 1 tablet by mouth daily    PANTOPRAZOLE (PROTONIX) 40 MG TABLET    Take 1 tablet by mouth every morning (before breakfast)    SILDENAFIL (VIAGRA) 50 MG TABLET    Take 50 mg by mouth as needed. SPIRONOLACTONE (ALDACTONE) 25 MG TABLET    Take 25 mg by mouth daily       Allergies     He is allergic to morphine. Physical Exam     INITIAL VITALS: BP: 125/75, Temp: 97.6 °F (36.4 °C), Heart Rate: 75, Resp: 13, SpO2: 96 %   General: Well-appearing, no acute distress  HEENT: head is atraumatic, pupils equal round and reactive to light, sclera are clear  Neck: Supple, no lymphadenopathy  Chest: Nonlabored respirations, equal chest rise bilaterally, no accessory muscle use. Cardiovascular: Normal rate, irregular rhythm, 2+ radial pulses bilaterally  Abdominal: Soft, nontender, nondistended, no rebound or guarding  Rectal: Good tone, no gross blood  Back: No CVA tenderness bilaterally  Skin: Warm, dry, well-perfused, no rashes  Musculoskeletal: No obvious deformities, no tenderness to palpation diffusely  Neurologic: Alert and oriented x4, speech is clear and intact without dysarthria, moving all extremities normally  Psych: Appropriate mood and affect  Diagnostic Results     EKG   Twelve-lead EKG performed on 3/11/2023 at 1248 hrs. Atrial fibrillation at a rate of 73. Normal axis. Good R wave progression. No significant Q waves noted. No ST segment changes noted.   Biphasic T waves in leads III and aVF. QTc normal at 431. QRS normal at 88. No STEMI. RADIOLOGY:  XR CHEST (2 VW)   Final Result      Cardiomegaly. Mild left basilar opacities, favored to be atelectasis.           LABS:   Results for orders placed or performed during the hospital encounter of 03/11/23   CBC with Auto Differential   Result Value Ref Range    WBC 7.2 4.0 - 11.0 K/uL    RBC 2.87 (L) 4.20 - 5.90 M/uL    Hemoglobin 6.0 (LL) 13.5 - 17.5 g/dL    Hematocrit 20.1 (LL) 40.5 - 52.5 %    MCV 70.0 (L) 80.0 - 100.0 fL    MCH 20.8 (L) 26.0 - 34.0 pg    MCHC 29.7 (L) 31.0 - 36.0 g/dL    RDW 18.9 (H) 12.4 - 15.4 %    Platelets 781 890 - 355 K/uL    MPV 9.1 5.0 - 10.5 fL    Neutrophils % 85.7 %    Lymphocytes % 6.1 %    Monocytes % 7.6 %    Eosinophils % 0.2 %    Basophils % 0.4 %    Neutrophils Absolute 6.2 1.7 - 7.7 K/uL    Lymphocytes Absolute 0.4 (L) 1.0 - 5.1 K/uL    Monocytes Absolute 0.5 0.0 - 1.3 K/uL    Eosinophils Absolute 0.0 0.0 - 0.6 K/uL    Basophils Absolute 0.0 0.0 - 0.2 K/uL   CMP w/ Reflex to MG   Result Value Ref Range    Sodium 141 136 - 145 mmol/L    Potassium reflex Magnesium 5.9 (H) 3.5 - 5.1 mmol/L    Chloride 106 99 - 110 mmol/L    CO2 20 (L) 21 - 32 mmol/L    Anion Gap 15 3 - 16    Glucose 168 (H) 70 - 99 mg/dL    BUN 17 7 - 20 mg/dL    Creatinine 1.4 (H) 0.8 - 1.3 mg/dL    Est, Glom Filt Rate 50 (A) >60    Calcium 8.8 8.3 - 10.6 mg/dL    Total Protein 6.6 6.4 - 8.2 g/dL    Albumin 3.5 3.4 - 5.0 g/dL    Albumin/Globulin Ratio 1.1 1.1 - 2.2    Total Bilirubin 0.7 0.0 - 1.0 mg/dL    Alkaline Phosphatase 65 40 - 129 U/L    ALT 16 10 - 40 U/L    AST 77 (H) 15 - 37 U/L   Lipase   Result Value Ref Range    Lipase 20.0 13.0 - 60.0 U/L   Troponin   Result Value Ref Range    Troponin <0.01 <0.01 ng/mL   BNP   Result Value Ref Range    Pro-BNP 3,758 (H) 0 - 449 pg/mL   APTT   Result Value Ref Range    aPTT 33.5 23.0 - 34.3 sec   Protime-INR   Result Value Ref Range    Protime 20.8 (H) 11.7 - 14.5 sec    INR 1.79 (H) 0.87 - 1.14   Digoxin Level   Result Value Ref Range    Digoxin Lvl 0.8 0.8 - 2.0 ng/mL   Basic Metabolic Panel   Result Value Ref Range    Sodium 139 136 - 145 mmol/L    Potassium 4.5 3.5 - 5.1 mmol/L    Chloride 105 99 - 110 mmol/L    CO2 23 21 - 32 mmol/L    Anion Gap 11 3 - 16    Glucose 118 (H) 70 - 99 mg/dL    BUN 17 7 - 20 mg/dL    Creatinine 1.4 (H) 0.8 - 1.3 mg/dL    Est, Glom Filt Rate 50 (A) >60    Calcium 9.2 8.3 - 10.6 mg/dL   POCT Glucose   Result Value Ref Range    POC Glucose 182 (H) 70 - 99 mg/dl    Performed on ACCU-CHEK        ED BEDSIDE ULTRASOUND:  No results found. RECENT VITALS:  BP: 130/72,Temp: 97.6 °F (36.4 °C), Heart Rate: 72, Resp: 18, SpO2: 96 %     Procedures     N/a    ED Course     Nursing Notes, Past Medical Hx, Past Surgical Hx, Social Hx,Allergies, and Family Hx were reviewed. patient was given the following medications:  Orders Placed This Encounter   Medications    ondansetron (ZOFRAN) injection 4 mg    0.9 % sodium chloride infusion       CONSULTS:  None    MEDICAL DECISIONMAKING / ASSESSMENT / Alexandro Sabas is a 79 y.o. male with a past medical history of A-fib on digoxin and Eliquis, hypertension, hyperlipidemia who presents to the emergency department today with nausea, vomiting, fatigue. He arrives to the emergency department his ABCs intact. His vital signs are within normal limits. No gross blood on rectal exam.  The remainder of his exam is reassuring. EKG shows atrial fibrillation. Chest x-ray shows atelectasis. Labs significant for a microcytic anemia. Initial potassium was hemolyzed but is normal on repeat. Troponin is negative. BNP is elevated from his baseline. PT/INR both mildly elevated. Renal function is at his baseline. Digoxin level is within normal limits. He denies the use of NSAIDs. Given that he has a history of iron deficiency anemia, it is likely that is what is causing this today.   However, he has had a large drop since his last hemoglobin a couple years ago.  I think he should be admitted for further evaluation.  I consented him for blood.  We will give him 1 unit with additional unit being held.  He also received pantoprazole I discussed case with the hospitalist who requested a CT scan to rule out retroperitoneal hemorrhage and who accepted him for admission.  He is admitted in stable condition.    Amount and/or Complexity of Data Reviewed  External Data Reviewed: labs and notes.  Labs: ordered. Decision-making details documented in ED Course.  Radiology: ordered and independent interpretation performed. Decision-making details documented in ED Course.  ECG/medicine tests: ordered and independent interpretation performed. Decision-making details documented in ED Course.    Risk  Prescription drug management.  Decision regarding hospitalization.        Clinical Impression     1. Anemia, unspecified type        Disposition     PATIENT REFERRED TO:  No follow-up provider specified.    DISCHARGE MEDICATIONS:  New Prescriptions    No medications on file       DISPOSITION Decision To Admit 03/11/2023 04:19:35 PM         Andrea Bauer MD  03/11/23 5616

## 2023-03-11 NOTE — H&P
Hospital Medicine History & Physical      PCP: Delmi De La Rosa    Date of Admission: 3/11/2023    Date of Service: Pt seen/examined on 3/11/2023 and Admitted to Inpatient with expected LOS greater than two midnights due to medical therapy. Chief Complaint: Extreme fatigue, nausea vomiting      History Of Present Illness: The patient is a 79 y.o. male with medical history as below, notable for CAD, atrial fibrillation on aspirin and Eliquis, prior GI bleed in 2019 who presents to [facility] with chief complaint of extreme fatigue. Patient has been feeling more tired lately and would get short of breath exertion. He worked with his cardiologist and PCP on those symptoms and has been placed on digoxin and iron supplements. Today he had busy morning taking his grandson for driving test and upon getting back home he felt exhausted and had near collapse, followed by episode of non blood green emesis. Discussed with ER physician who reports that Hgb was 6.0 and rectal exam showed no stool. Past Medical History:        Diagnosis Date    Abnormal EKG     Arthritis     Atrial fibrillation (HCC)     CAD S/P percutaneous coronary angioplasty 01/2019    RCA 3.5 x 12 mm Xience (2 overlapping stents). HLD (hyperlipidemia)     Hypertension     Osteoarthritis     Pure hypercholesterolemia        Past Surgical History:        Procedure Laterality Date    CARDIOVERSION  1/9/15    COLONOSCOPY N/A 2/9/2019    COLONOSCOPY DIAGNOSTIC performed by Gem Ye MD at [address] [place] [address] N/A 2/9/2019    EGD DIAGNOSTIC ONLY performed by Gem Ye MD at [facility]       Medications Prior to Admission:    Prior to Admission medications    Medication Sig Start Date End Date Taking?  Authorizing Provider   digoxin (LANOXIN) 125 MCG tablet Take 125 mcg by mouth daily   Yes Historical Provider, MD   apixaban (ELIQUIS) 2.5 MG TABS tablet Take 1 tablet by mouth 2 times daily 8/20/21   MAX Montana - CNP   spironolactone (ALDACTONE) 25 MG tablet Take 25 mg by mouth daily    Historical Provider, MD   allopurinol (ZYLOPRIM) 100 MG tablet Take 100 mg by mouth daily    Historical Provider, MD   metoprolol succinate (TOPROL XL) 200 MG extended release tablet Take 1 tablet by mouth daily 3/22/19   MAX Montana - CNP   furosemide (LASIX) 20 MG tablet Take 1 tablet by mouth daily 3/22/19   Bia Dawson, MAX - CNP   pantoprazole (PROTONIX) 40 MG tablet Take 1 tablet by mouth every morning (before breakfast) 3/11/19   Bia Dawson, APRN - CNP   atorvastatin (LIPITOR) 40 MG tablet Take 1 tablet by mouth daily  Patient taking differently: Take 80 mg by mouth daily 3/11/19   Bia Dawson, MAX - CNP   aspirin 81 MG chewable tablet Take 1 tablet by mouth daily 3/11/19   MAX Montana - CNP   ergocalciferol (ERGOCALCIFEROL) 91361 units capsule Take 1 capsule by mouth once a week 3/11/19   MAX Montana - CNP   lisinopril (PRINIVIL;ZESTRIL) 40 MG tablet Take 1 tablet by mouth daily 2/19/19   Bia MAX Dawson - CNP   ferrous sulfate 325 (65 Fe) MG tablet Take 1 tablet by mouth daily (with breakfast) 2/12/19   Farideh Miller MD   sildenafil (VIAGRA) 50 MG tablet Take 50 mg by mouth as needed. Historical Provider, MD       Allergies:  Morphine    Social History:  The patient currently lives at home. TOBACCO:   reports that he has never smoked. He has never used smokeless tobacco.  ETOH:   reports no history of alcohol use. Family History:  Reviewed in detail and Positive as follows:        Problem Relation Age of Onset    Heart Failure Brother        REVIEW OF SYSTEMS:   Positive and negative as noted in the HPI. All other systems reviewed and negative.     PHYSICAL EXAM:    /72   Pulse 72   Temp 97.6 °F (36.4 °C)   Resp 18   Ht 6' 2\" (1.88 m)   Wt 220 lb (99.8 kg)   SpO2 96%   BMI 28.25 kg/m²     General appearance: No apparent distress appears stated age and cooperative. HEENT Normal cephalic, atraumatic without obvious deformity. Pupils equal, round, and reactive to light. Extra ocular muscles intact. Conjunctivae/corneas clear. Neck: Supple, No jugular venous distention/bruits. Trachea midline without thyromegaly or adenopathy with full range of motion. Lungs: coarse breath sounds b/l with frequent phlegmy cough during exam  Heart: Regular rate and rhythm with Normal S1/S2 without murmurs, rubs or gallops, point of maximum impulse non-displaced  Abdomen: Soft, non-tender or non-distended without rigidity or guarding and positive bowel sounds all four quadrants. Extremities: No clubbing, cyanosis, or edema bilaterally. Skin: Skin color, texture, turgor normal.   Neurologic: Alert and oriented X 3, grossly non-focal.  Mental status: Alert, oriented, thought content appropriate.   Capillary refill is brisk  Peripheral pulses 2+    CXR:  I have reviewed the CXR with the following interpretation: no consolidation  EKG:  I have reviewed the EKG with the following interpretation: EKG not available     The following labs reviewed personally:   CBC   Recent Labs     03/11/23  1413   WBC 7.2   HGB 6.0*   HCT 20.1*         RENAL  Recent Labs     03/11/23  1413 03/11/23  1549    139   K 5.9* 4.5    105   CO2 20* 23   BUN 17 17   CREATININE 1.4* 1.4*     LFT'S  Recent Labs     03/11/23  1413   AST 77*   ALT 16   BILITOT 0.7   ALKPHOS 65     COAG  Recent Labs     03/11/23  1413   INR 1.79*     CARDIAC ENZYMES  Recent Labs     03/11/23  1413   TROPONINI <0.01       U/A:    Lab Results   Component Value Date/Time    COLORU Yellow 06/26/2018 02:56 PM    CLARITYU Clear 06/26/2018 02:56 PM    SPECGRAV 1.020 06/26/2018 02:56 PM    LEUKOCYTESUR Negative 06/26/2018 02:56 PM    BLOODU Negative 06/26/2018 02:56 PM    GLUCOSEU Negative 06/26/2018 02:56 PM       ABG  No results found for: [id number], BEART, [id number], PHART, Raiza In[place], [place]        Active Hospital Problems    Diagnosis Date Noted    Anemia [D64.9] 03/11/2023     Priority: Medium         ASSESSMENT/PLAN:    Anemia  History of prior GI bleed in 2019  Nausea vomiting prior to admission  CAD  A fib on eliquis and digoxin  CHF EF 60-65%    Plan:  Hold anticoagulants (hold asa, eliquis)  Transfuse to Hgb above 7-8  Obtain anemia labs and also CT A/P to investigate source of anemia  Place on PPI and consult GI  Clear liquid diet  Hold lasix, bucky and lisinopril until HD and creatinine are stable  Cont digoxin and monitor on telemetry    DVT Prophylaxis: SCD  Diet: No diet orders on file  Code Status: Prior  PT/OT Eval Status: at baseline    Merrie Boxer, MD    Thank you Gabriela Davis for the opportunity to be involved in this patient's care. If you have any questions or concerns please feel free to contact me at [phone].

## 2023-03-12 LAB
ANION GAP SERPL CALCULATED.3IONS-SCNC: 9 MMOL/L (ref 3–16)
BASOPHILS ABSOLUTE: 0 K/UL (ref 0–0.2)
BASOPHILS RELATIVE PERCENT: 0.2 %
BLOOD BANK DISPENSE STATUS: NORMAL
BLOOD BANK PRODUCT CODE: NORMAL
BPU ID: NORMAL
BUN BLDV-MCNC: 14 MG/DL (ref 7–20)
CALCIUM SERPL-MCNC: 8.6 MG/DL (ref 8.3–10.6)
CHLORIDE BLD-SCNC: 108 MMOL/L (ref 99–110)
CO2: 23 MMOL/L (ref 21–32)
CREAT SERPL-MCNC: 1.5 MG/DL (ref 0.8–1.3)
DESCRIPTION BLOOD BANK: NORMAL
EKG ATRIAL RATE: 78 BPM
EKG DIAGNOSIS: NORMAL
EKG Q-T INTERVAL: 392 MS
EKG QRS DURATION: 88 MS
EKG QTC CALCULATION (BAZETT): 431 MS
EKG R AXIS: 59 DEGREES
EKG T AXIS: 10 DEGREES
EKG VENTRICULAR RATE: 73 BPM
EOSINOPHILS ABSOLUTE: 0 K/UL (ref 0–0.6)
EOSINOPHILS RELATIVE PERCENT: 0.4 %
GFR SERPL CREATININE-BSD FRML MDRD: 46 ML/MIN/{1.73_M2}
GLUCOSE BLD-MCNC: 95 MG/DL (ref 70–99)
HAPTOGLOBIN: 111 MG/DL (ref 30–200)
HCT VFR BLD CALC: 22.3 % (ref 40.5–52.5)
HCT VFR BLD CALC: 25.7 % (ref 40.5–52.5)
HCT VFR BLD CALC: 27.1 % (ref 40.5–52.5)
HEMOGLOBIN: 6.6 G/DL (ref 13.5–17.5)
HEMOGLOBIN: 7.9 G/DL (ref 13.5–17.5)
HEMOGLOBIN: 8.1 G/DL (ref 13.5–17.5)
IRON SATURATION: 6 % (ref 20–50)
IRON: 25 UG/DL (ref 59–158)
LYMPHOCYTES ABSOLUTE: 0.8 K/UL (ref 1–5.1)
LYMPHOCYTES RELATIVE PERCENT: 8.4 %
MCH RBC QN AUTO: 21.9 PG (ref 26–34)
MCHC RBC AUTO-ENTMCNC: 29.8 G/DL (ref 31–36)
MCV RBC AUTO: 73.3 FL (ref 80–100)
MONOCYTES ABSOLUTE: 0.8 K/UL (ref 0–1.3)
MONOCYTES RELATIVE PERCENT: 9.1 %
NEUTROPHILS ABSOLUTE: 7.6 K/UL (ref 1.7–7.7)
NEUTROPHILS RELATIVE PERCENT: 81.9 %
PDW BLD-RTO: 19.8 % (ref 12.4–15.4)
PLATELET # BLD: 278 K/UL (ref 135–450)
PMV BLD AUTO: 8.1 FL (ref 5–10.5)
POTASSIUM REFLEX MAGNESIUM: 3.8 MMOL/L (ref 3.5–5.1)
RBC # BLD: 3.04 M/UL (ref 4.2–5.9)
SODIUM BLD-SCNC: 140 MMOL/L (ref 136–145)
TOTAL IRON BINDING CAPACITY: 397 UG/DL (ref 260–445)
VITAMIN B-12: 628 PG/ML (ref 211–911)
WBC # BLD: 9.2 K/UL (ref 4–11)

## 2023-03-12 PROCEDURE — 36415 COLL VENOUS BLD VENIPUNCTURE: CPT

## 2023-03-12 PROCEDURE — 2580000003 HC RX 258: Performed by: INTERNAL MEDICINE

## 2023-03-12 PROCEDURE — 94664 DEMO&/EVAL PT USE INHALER: CPT

## 2023-03-12 PROCEDURE — 36430 TRANSFUSION BLD/BLD COMPNT: CPT

## 2023-03-12 PROCEDURE — 85018 HEMOGLOBIN: CPT

## 2023-03-12 PROCEDURE — 1200000000 HC SEMI PRIVATE

## 2023-03-12 PROCEDURE — 6370000000 HC RX 637 (ALT 250 FOR IP): Performed by: INTERNAL MEDICINE

## 2023-03-12 PROCEDURE — 94640 AIRWAY INHALATION TREATMENT: CPT

## 2023-03-12 PROCEDURE — 80048 BASIC METABOLIC PNL TOTAL CA: CPT

## 2023-03-12 PROCEDURE — 6360000002 HC RX W HCPCS: Performed by: INTERNAL MEDICINE

## 2023-03-12 PROCEDURE — 94761 N-INVAS EAR/PLS OXIMETRY MLT: CPT

## 2023-03-12 PROCEDURE — 85025 COMPLETE CBC W/AUTO DIFF WBC: CPT

## 2023-03-12 PROCEDURE — C9113 INJ PANTOPRAZOLE SODIUM, VIA: HCPCS | Performed by: INTERNAL MEDICINE

## 2023-03-12 PROCEDURE — 85014 HEMATOCRIT: CPT

## 2023-03-12 PROCEDURE — 94150 VITAL CAPACITY TEST: CPT

## 2023-03-12 PROCEDURE — 94669 MECHANICAL CHEST WALL OSCILL: CPT

## 2023-03-12 RX ORDER — ALBUTEROL SULFATE 2.5 MG/3ML
2.5 SOLUTION RESPIRATORY (INHALATION) EVERY 4 HOURS PRN
Status: DISCONTINUED | OUTPATIENT
Start: 2023-03-12 | End: 2023-03-13 | Stop reason: HOSPADM

## 2023-03-12 RX ORDER — SODIUM CHLORIDE 9 MG/ML
INJECTION, SOLUTION INTRAVENOUS PRN
Status: DISCONTINUED | OUTPATIENT
Start: 2023-03-12 | End: 2023-03-13 | Stop reason: HOSPADM

## 2023-03-12 RX ADMIN — PANTOPRAZOLE SODIUM 40 MG: 40 INJECTION, POWDER, FOR SOLUTION INTRAVENOUS at 20:32

## 2023-03-12 RX ADMIN — PANTOPRAZOLE SODIUM 40 MG: 40 INJECTION, POWDER, FOR SOLUTION INTRAVENOUS at 12:40

## 2023-03-12 RX ADMIN — DIGOXIN 125 MCG: 125 TABLET ORAL at 09:33

## 2023-03-12 RX ADMIN — ALLOPURINOL 100 MG: 100 TABLET ORAL at 09:34

## 2023-03-12 RX ADMIN — SODIUM CHLORIDE, PRESERVATIVE FREE 10 ML: 5 INJECTION INTRAVENOUS at 20:32

## 2023-03-12 RX ADMIN — METOPROLOL SUCCINATE 200 MG: 100 TABLET, EXTENDED RELEASE ORAL at 09:34

## 2023-03-12 RX ADMIN — ALBUTEROL SULFATE 2.5 MG: 2.5 SOLUTION RESPIRATORY (INHALATION) at 09:10

## 2023-03-12 RX ADMIN — SODIUM CHLORIDE: 9 INJECTION, SOLUTION INTRAVENOUS at 08:06

## 2023-03-12 NOTE — RT PROTOCOL NOTE
RT Nebulizer Bronchodilator Protocol Note    There is a bronchodilator order in the chart from a provider indicating to follow the RT Bronchodilator Protocol and there is an “Initiate RT Bronchodilator Protocol” order as well (see protocol at bottom of note).    CXR Findings:  No results found.    The findings from the last RT Protocol Assessment were as follows:  Smoking: None or smoker <15 pack years  Respiratory Pattern: Regular pattern and RR 12-20 bpm  Breath Sounds: Clear breath sounds  Cough: Strong, spontaneous, non-productive  Indication for Bronchodilator Therapy: Decreased or absent breath sounds  Bronchodilator Assessment Score: 0    Aerosolized bronchodilator medication orders have been revised according to the RT Nebulizer Bronchodilator Protocol below.    Respiratory Therapist to perform RT Therapy Protocol Assessment initially then follow the protocol.  Repeat RT Therapy Protocol Assessment PRN for score 0-3 or on second treatment, BID, and PRN for scores above 3.    No Indications - adjust the frequency to every 6 hours PRN wheezing or bronchospasm, if no treatments needed after 48 hours then discontinue using Per Protocol order mode.     If indication present, adjust the RT bronchodilator orders based on the Bronchodilator Assessment Score as indicated below.  If a patient is on this medication at home then do not decrease Frequency below that used at home.    0-3 - enter or revise RT bronchodilator order(s) to equivalent RT Bronchodilator order with Frequency of every 4 hours PRN for wheezing or increased work of breathing using Per Protocol order mode.       4-6 - enter or revise RT Bronchodilator order(s) to two equivalent RT bronchodilator orders with one order with BID Frequency and one order with Frequency of every 4 hours PRN wheezing or increased work of breathing using Per Protocol order mode.         7-10 - enter or revise RT Bronchodilator order(s) to two equivalent RT bronchodilator  orders with one order with TID Frequency and one order with Frequency of every 4 hours PRN wheezing or increased work of breathing using Per Protocol order mode. 11-13 - enter or revise RT Bronchodilator order(s) to one equivalent RT bronchodilator order with QID Frequency and an Albuterol order with Frequency of every 4 hours PRN wheezing or increased work of breathing using Per Protocol order mode. Greater than 13 - enter or revise RT Bronchodilator order(s) to one equivalent RT bronchodilator order with every 4 hours Frequency and an Albuterol order with Frequency of every 2 hours PRN wheezing or increased work of breathing using Per Protocol order mode. RT to enter RT Home Evaluation for COPD & MDI Assessment order using Per Protocol order mode.     Electronically signed by Petty Deng RCP on 3/12/2023 at 9:22 AM

## 2023-03-12 NOTE — PROGRESS NOTES
VSS, A&O, denies pain and n/v during this shift. Pt voiding using the urinal, tolerating diet. Call light within reach. Uneventful night with pt.

## 2023-03-12 NOTE — PROGRESS NOTES
Patient alert and oriented x 4. VSS. Denies any pain. Blood transfusion one unit completed today with benefit for the patient. Patient tolerated well without any complications. Post transfusion Hgb results 7.9 and Hct 25.7. Patient voided yellow, clear, without odor urine. Patient verbalized that is taking 40 mg of lipitor daily and refused to take 80 mg of lipitor. Dr. Amna Alejandra informed. Patient tolerated liquid diet well. Bed in lowest position. Bed brakes on. Call light within reach. All patient's need met.    Vitals:    03/12/23 1547   BP: 135/78   Pulse: 57   Resp: 18   Temp: 97.8 °F (36.6 °C)   SpO2: 98%

## 2023-03-12 NOTE — CONSULTS
GI Consult Note      Admission Date: 3/11/2023  Hospital Day: Hospital Day: 2  Attending: Brandie Bowen MD  Date of service: 3/12/23    Subjective:     Chief complaint/ Reason for consult:   Iron deficiency anemia       HPI: Ramírez Hancock is a 79 y.o.  male patient, who was seen at the request of Dr. Brandie Bowen MD.    History was obtained from chart review and the patient. 76-year-old -American male with history of coronary artery disease, atrial fibrillation maintained on aspirin and Eliquis, personal history of advanced colon polyps who now presents for further evaluation of symptomatic anemia. Patient states that he has had new onset fatigue and dyspnea on exertion. He was found to have a hemoglobin of 6.0. He does have a long history of iron deficiency anemia the duration of which is undetermined. He has been taking iron for quite some time and was taking it throughout 2022. He had a hemoglobin of 11.0 with increased iron saturations in November 2022 measured at the [facility]. His iron was discontinued at that time due to the increased iron saturation and because of his constipation which she attributed to the medication. As stated on admission his hemoglobin was 6 and his MCV was 70 which has developed since he has been off iron. He denies any overt GI bleeding. Denies any constipation, diarrhea, abdominal pain, weight loss, heartburn, dysphagia. He takes aspirin and Eliquis. Last colonoscopy and upper endoscopy in 2019 with Dr. David Butler and were unremarkable except for diverticulosis. He has a history of appendiceal tubulovillous adenoma first found in 2013 via colonoscopy at the emergency of [place]. The polyp was resected but found to be recurrent in 2017 where he underwent 2 colonoscopies with polyp removal at that time including a lift with polypectomy and APC applied to the region. His aspirin and Eliquis are currently on hold. Past Endoscopic History:  As above                    Past Medical History:     Past Medical History:   Diagnosis Date    Abnormal EKG     Arthritis     Atrial fibrillation (HCC)     CAD S/P percutaneous coronary angioplasty 01/2019    RCA 3.5 x 12 mm Xience (2 overlapping stents). HLD (hyperlipidemia)     Hypertension     Osteoarthritis     Pure hypercholesterolemia        Past Surgical History:    Past Surgical History:   Procedure Laterality Date    CARDIOVERSION  1/9/15    COLONOSCOPY N/A 2/9/2019    COLONOSCOPY DIAGNOSTIC performed by Mardeen Cushing, MD at [address] [place] [address] N/A 2/9/2019    EGD DIAGNOSTIC ONLY performed by Mardeen Cushing, MD at [address] [place] [address] History:     Tobacco use:   reports that he has never smoked. He has never used smokeless tobacco.  Alcohol use:   reports no history of alcohol use. Currently lives in: [address] [place] [address]   reports no history of drug use.          Family History:       Problem Relation Age of Onset    Heart Failure Brother        Medications:    pantoprazole  80 mg IntraVENous Once    allopurinol  100 mg Oral Daily    atorvastatin  80 mg Oral Daily    digoxin  125 mcg Oral Daily    metoprolol succinate  200 mg Oral Daily    sodium chloride flush  5-40 mL IntraVENous 2 times per day    pantoprazole  40 mg IntraVENous BID            Objective:   PHYSICAL EXAM:      Vitals:   Vitals:    03/12/23 0928 03/12/23 0938 03/12/23 1049 03/12/23 1231   BP: 136/74 138/71 133/76 136/72   Pulse: 79 76 66 68   Resp: 18 18 18 18   Temp: 97.9 °F (36.6 °C) 98 °F (36.7 °C) 97.8 °F (36.6 °C) 98.1 °F (36.7 °C)   TempSrc: Oral Oral Oral Oral   SpO2: 98% 96% 97% 94%   Weight:       Height:           General appearance: alert, cooperative, no distress, appears stated age  Eyes: Anicteric  Head: Normocephalic, without obvious abnormality  Lungs: clear to auscultation bilaterally, Normal Effort  Heart: regular rate and rhythm, normal S1 and S2, no murmurs or rubs  Abdomen: soft, non-tender. Bowel sounds normal. No masses,  no organomegaly. Extremities: atraumatic, no cyanosis or edema  Skin: warm and dry, no jaundice  Neuro: Grossly intact, A&OX3    Intake and output:   I/O last 3 completed shifts: In: 270 [Blood:270]  Out: 200 [Urine:200]    Lab Data:      CBC:   Recent Labs     03/11/23  1413 03/11/23  2211 03/12/23  0641   WBC 7.2  --  9.2   RBC 2.87*  --  3.04*   HGB 6.0* 7.1* 6.6*   HCT 20.1* 24.0* 22.3*     --  278   MCV 70.0*  --  73.3*   MCH 20.8*  --  21.9*   MCHC 29.7*  --  29.8*   RDW 18.9*  --  19.8*        BMP:  Recent Labs     03/11/23  1413 03/11/23  1549 03/12/23  0641    139 140   K 5.9* 4.5 3.8    105 108   CO2 20* 23 23   BUN 17 17 14   CREATININE 1.4* 1.4* 1.5*   CALCIUM 8.8 9.2 8.6   GLUCOSE 168* 118* 95        Hepatic Function Panel:   Recent Labs     03/11/23  1413   AST 77*   ALT 16   BILITOT 0.7   ALKPHOS 65       Recent Labs     03/11/23  1413   LIPASE 20.0     Recent Labs     03/11/23  1413   PROTIME 20.8*   INR 1.79*     No results for input(s): PTT in the last 72 hours. No results for input(s): OCCULTBLD in the last 72 hours. Imaging:    CT ABDOMEN PELVIS W IV CONTRAST Additional Contrast? None   Final Result      1. No acute intra-abdominopelvic abnormality. 2. Sigmoid diverticulosis. 3. Right lower lobe airspace consolidation which may relate to pneumonia or aspiration. XR CHEST (2 VW)   Final Result      Cardiomegaly. Mild left basilar opacities, favored to be atelectasis. Known drug Allergies: Allergies   Allergen Reactions    Morphine Nausea And Vomiting and Other (See Comments)     Cardiac Issues           Assessment:   The patient is a 79 y.o. old male  with following problems:    Symptomatic iron deficiency anemia with hemoglobin 6.0, MCV of 70 on presentation. Last hemoglobin in November 2022 was 11 after taking iron supplementation all year long. His iron saturation was high at that time so his iron was discontinued. Since discontinuing his iron it seems his hemoglobin has slowly trended lower to the current value consistent with probable continued GI blood loss. His last upper endoscopy and colonoscopy was in 2019 with Dr. Smita Lundberg which were unremarkable. 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  No appendiceal polyp was noted in 2019 during his colonoscopy. He denies any overt GI bleeding    2. Anticoagulation with aspirin and Eliquis for atrial fibrillation    Recommendations:   1. I recommend an upper endoscopy and a colonoscopy to evaluate his profound iron deficiency anemia with special attention to the area of the appendix during his colonoscopy because of his history of tubulovillous adenoma of this region treated last in 2017 with left polypectomy and APC at the [facility]. This is an area that is notoriously difficult to completely eradicate a polyp and often requires an extension appendectomy to remove completely. Small intestinal angiodysplasias are another strong consideration. Unfortunately he is not agreeable to the procedures at this time. His aspirin and Eliquis are on hold due to concern for ongoing GI bleeding. I discussed with him the risk of an undiagnosed serious condition such as malignancy or peptic ulcer disease which left undiagnosed could lead to advanced stage disease or life-threatening bleed especially in the context of 2 anticoagulants. We also cannot recommend him restart his anticoagulation at this time due to his profound anemia and concern for ongoing GI blood loss.   I discussed with him that this presents an additional risk of development of thromboembolism and other complications from holding his blood thinner which could be restarted sooner if he were to proceed with evaluation to determine the source of his bleeding. He states he will reconsider and discuss further tomorrow. Dr. Sylwia Bill is taking over for me tomorrow and he will follow-up.       William East MD  [address] [place] [address]

## 2023-03-12 NOTE — PROGRESS NOTES
Hospitalist Progress Note      PCP: Denisa Ge    Date of Admission: 3/11/2023      Subjective: seen and examined  Getting a unit of blood         Medications:  Reviewed    Infusion Medications    sodium chloride      sodium chloride      sodium chloride 15 mL/hr at 03/12/23 0806     Scheduled Medications    pantoprazole  80 mg IntraVENous Once    allopurinol  100 mg Oral Daily    atorvastatin  80 mg Oral Daily    digoxin  125 mcg Oral Daily    metoprolol succinate  200 mg Oral Daily    sodium chloride flush  5-40 mL IntraVENous 2 times per day    pantoprazole  40 mg IntraVENous BID     PRN Meds: sodium chloride, albuterol, sodium chloride, sodium chloride flush, sodium chloride, ondansetron **OR** ondansetron, polyethylene glycol, acetaminophen **OR** acetaminophen      Intake/Output Summary (Last 24 hours) at 3/12/2023 1711  Last data filed at 3/12/2023 1231  Gross per 24 hour   Intake 458 ml   Output 900 ml   Net -442 ml       Physical Exam Performed:    /78   Pulse 57   Temp 97.8 °F (36.6 °C) (Oral)   Resp 18   Ht 6' 2\" (1.88 m)   Wt 227 lb 4.7 oz (103.1 kg)   SpO2 98%   BMI 29.18 kg/m²     General appearance: No apparent distress, appears stated age and cooperative. HEENT: Pupils equal, round, and reactive to light. Conjunctivae/corneas clear. Neck: Supple, with full range of motion. No jugular venous distention. Trachea midline. Respiratory:  Normal respiratory effort. Clear to auscultation, bilaterally without Rales/Wheezes/Rhonchi. Cardiovascular: Regular rate and rhythm with normal S1/S2 without murmurs, rubs or gallops. Abdomen: Soft, non-tender, non-distended with normal bowel sounds. Musculoskeletal: No clubbing, cyanosis or edema bilaterally. Full range of motion without deformity. Skin: Skin color, texture, turgor normal.  No rashes or lesions. Neurologic:  Neurovascularly intact without any focal sensory/motor deficits.  Cranial nerves: II-XII intact, grossly non-focal.  Psychiatric: Alert and oriented, thought content appropriate, normal insight  Capillary Refill: Brisk, 3 seconds, normal   Peripheral Pulses: +2 palpable, equal bilaterally       Labs:   Recent Labs     03/11/23  1413 03/11/23  2211 03/12/23  0641 03/12/23  1252   WBC 7.2  --  9.2  --    HGB 6.0* 7.1* 6.6* 7.9*   HCT 20.1* 24.0* 22.3* 25.7*     --  278  --      Recent Labs     03/11/23  1413 03/11/23  1549 03/12/23  0641    139 140   K 5.9* 4.5 3.8    105 108   CO2 20* 23 23   BUN 17 17 14   CREATININE 1.4* 1.4* 1.5*   CALCIUM 8.8 9.2 8.6     Recent Labs     03/11/23  1413   AST 77*   ALT 16   BILITOT 0.7   ALKPHOS 65     Recent Labs     03/11/23  1413   INR 1.79*     Recent Labs     03/11/23  1413   TROPONINI <0.01       Urinalysis:      Lab Results   Component Value Date/Time    NITRU Negative 06/26/2018 02:56 PM    BLOODU Negative 06/26/2018 02:56 PM    SPECGRAV 1.020 06/26/2018 02:56 PM    GLUCOSEU Negative 06/26/2018 02:56 PM       Radiology:  CT ABDOMEN PELVIS W IV CONTRAST Additional Contrast? None   Final Result      1. No acute intra-abdominopelvic abnormality. 2. Sigmoid diverticulosis. 3. Right lower lobe airspace consolidation which may relate to pneumonia or aspiration. XR CHEST (2 VW)   Final Result      Cardiomegaly. Mild left basilar opacities, favored to be atelectasis.           IP CONSULT TO HOSPITALIST  IP CONSULT TO GI    Assessment/Plan:    Active Hospital Problems    Diagnosis     Anemia [D64.9]      Priority: Medium     Anemia  History of prior GI bleed in 2019  Nausea vomiting prior to admission  CAD  A fib on eliquis and digoxin  CHF EF 60-65%     Plan:  Hold anticoagulants (hold asa, eliquis)  Transfuse to Hgb above 7-8  Obtain anemia labs and also CT A/P to investigate source of anemia  Place on PPI and consult GI  Clear liquid diet  Hold lasix, bucky and lisinopril until HD and creatinine are stable  Cont digoxin and monitor on telemetry    DVT Prophylaxis: scds  Diet: ADULT DIET;  Clear Liquid  Diet NPO  Code Status: Full Code  PT/OT Eval Status: n/a     Dispo - inpatient , pending evaluation        Angela Vora MD

## 2023-03-12 NOTE — PROGRESS NOTES
4 Eyes Admission Assessment     I agree as the admission nurse that 2 RN's have performed a thorough Head to Toe Skin Assessment on the patient. ALL assessment sites listed below have been assessed on admission. Areas assessed by both nurses:   [x]   Head, Face, and Ears   [x]   Shoulders, Back, and Chest  [x]   Arms, Elbows, and Hands   [x]   Coccyx, Sacrum, and Ischium  [x]   Legs, Feet, and Heels        Does the Patient have Skin Breakdown?   No         Lacho Prevention initiated:  No   Wound Care Orders initiated:  No      [facility] nurse consulted for Pressure Injury (Stage 3,4, Unstageable, DTI, NWPT, and Complex wounds) or Lacho score 18 or lower:  No      Nurse 1 eSignature: Electronically signed by Florian Tate RN on 3/12/23 at 6:52 AM EDT    **SHARE this note so that the co-signing nurse is able to place an eSignature**    Nurse 2 eSignature: Electronically signed by Shorty Cruz RN on 3/12/23 at 7:30 AM EDT

## 2023-03-13 VITALS
BODY MASS INDEX: 29.06 KG/M2 | WEIGHT: 226.41 LBS | OXYGEN SATURATION: 97 % | RESPIRATION RATE: 18 BRPM | HEIGHT: 74 IN | SYSTOLIC BLOOD PRESSURE: 114 MMHG | HEART RATE: 69 BPM | DIASTOLIC BLOOD PRESSURE: 64 MMHG | TEMPERATURE: 98 F

## 2023-03-13 LAB
HCT VFR BLD CALC: 24.9 % (ref 40.5–52.5)
HCT VFR BLD CALC: 26.5 % (ref 40.5–52.5)
HCT VFR BLD CALC: 26.9 % (ref 40.5–52.5)
HEMOGLOBIN: 7.8 G/DL (ref 13.5–17.5)
HEMOGLOBIN: 8.1 G/DL (ref 13.5–17.5)
HEMOGLOBIN: 8.1 G/DL (ref 13.5–17.5)

## 2023-03-13 PROCEDURE — 85014 HEMATOCRIT: CPT

## 2023-03-13 PROCEDURE — 6360000002 HC RX W HCPCS: Performed by: INTERNAL MEDICINE

## 2023-03-13 PROCEDURE — C9113 INJ PANTOPRAZOLE SODIUM, VIA: HCPCS | Performed by: INTERNAL MEDICINE

## 2023-03-13 PROCEDURE — 85018 HEMOGLOBIN: CPT

## 2023-03-13 PROCEDURE — 6370000000 HC RX 637 (ALT 250 FOR IP): Performed by: INTERNAL MEDICINE

## 2023-03-13 PROCEDURE — 36415 COLL VENOUS BLD VENIPUNCTURE: CPT

## 2023-03-13 PROCEDURE — 2580000003 HC RX 258: Performed by: INTERNAL MEDICINE

## 2023-03-13 RX ADMIN — PANTOPRAZOLE SODIUM 40 MG: 40 INJECTION, POWDER, FOR SOLUTION INTRAVENOUS at 11:22

## 2023-03-13 RX ADMIN — METOPROLOL SUCCINATE 200 MG: 100 TABLET, EXTENDED RELEASE ORAL at 11:15

## 2023-03-13 RX ADMIN — DIGOXIN 125 MCG: 125 TABLET ORAL at 11:15

## 2023-03-13 RX ADMIN — SODIUM CHLORIDE, PRESERVATIVE FREE 10 ML: 5 INJECTION INTRAVENOUS at 09:00

## 2023-03-13 RX ADMIN — ALLOPURINOL 100 MG: 100 TABLET ORAL at 11:16

## 2023-03-13 NOTE — DISCHARGE SUMMARY
Hospital Discharge Summary    Patient's PCP: Monty Harmon Date: 3/11/2023   Discharge Date: 3/13/2023    Admitting Physician: Dr. Jt Haq MD  Discharge Physician: Dr. Jean-Paul Renteria MD     Consults:   IP CONSULT TO HOSPITALIST  IP CONSULT TO GI    Brief HPI: The patient is a 79 y.o. male with medical history as below, notable for CAD, atrial fibrillation on aspirin and Eliquis, prior GI bleed in 2019 who presents to [facility] with chief complaint of extreme fatigue. Patient has been feeling more tired lately and would get short of breath exertion. He worked with his cardiologist and PCP on those symptoms and has been placed on digoxin and iron supplements. Today he had busy morning taking his grandson for driving test and upon getting back home he felt exhausted and had near collapse, followed by episode of non blood green emesis. Discussed with ER physician who reports that Hgb was 6.0 and rectal exam showed no stool. Brief hospital course:     Symptomatic iron deficiency anemia with hemoglobin of 6 on presentation. Jefferson Schwarz His iron supplements were discontinued last year as his iron saturation was high. Patient has slowly trending down hemoglobin. Last endoscopy colonoscopy was in 2019. Patient has a history of advanced colon polyps with recurrent appendiceal tubular villous adenoma with removal.  -GI was consulted  Patient refused endoscopy and colonoscopy during this admission  Had a lengthy discussion with patient and his daughter at bedside. GI discussed with the patient risk of undiagnosed serious condition such as malignancy or peptic ulcer disease which left undiagnosed could lead to advanced stage disease or life-threatening bleed especially in the context of anticoagulants.   Still refused scopes during this admission.  -Patient states that he is going to talk to his cardiology and PCP and will get outpatient procedures.  -Patient understand the risk of holding anticoagulation currently including risk of pulmonary embolism, stroke. Patient states that he is going to discuss with his cardiology and will get outpatient procedures.   -At the time of discharge I will be holding aspirin and Eliquis as we are not sure of his source of bleeding.   -Received 2 units of blood in hospital, at the time of discharge hemoglobin 8.1    Invasive procedures:  None     Discharge Diagnoses:   Principal Problem:    Anemia  Resolved Problems:    * No resolved hospital problems. *      Physical Exam: /70   Pulse 71   Temp 98.4 °F (36.9 °C) (Oral)   Resp 18   Ht 6' 2\" (1.88 m)   Wt 226 lb 6.6 oz (102.7 kg)   SpO2 97%   BMI 29.07 kg/m²   Gen/overall appearance: Not in acute distress. Alert. Head: Normocephalic, atraumatic  Eyes: EOMI, good acuity  ENT:- Oral mucosa moist  Neck: No JVD, thyromegaly  CVS: Nml S1S2, no MRG, RRR  Pulm: Clear bilaterally. No crackles/wheezes  Gastrointestinal: Soft, NT/ND, +BS  Musculoskeletal: No edema. Warm  Neuro: No focal deficit. Moves extremity spontaneously. Psychiatry: Appropriate affect. Not agitated. Skin: Warm, dry with normal turgor. No rash        Significant diagnostic studies that may require follow up:  XR CHEST (2 VW)    Result Date: 3/11/2023  PA AND LATERAL CHEST X-RAY: REASON FOR EXAM: weakness COMPARISON: February 8, 2019 FINDINGS: PA and lateral views of the chest demonstrates cardiomegaly with mild tortuosity of the thoracic aorta. Mild left basilar opacities felt to be atelectasis. Right lung clear. Normal pulmonary vascularity. No pleural effusion or acute bony abnormality. Cardiomegaly. Mild left basilar opacities, favored to be atelectasis. CT ABDOMEN PELVIS W IV CONTRAST Additional Contrast? None    Result Date: 3/11/2023  EXAM: CT ABDOMEN AND PELVIS WITH CONTRAST INDICATION: anemia, no sign of bleed, r/o retroperitoneal hemorrhage, Pain COMPARISON: None.  TECHNIQUE: Axial CT imaging obtained from lung bases through pelvis. Axial images and multiplanar reformatted images are provided for review. Individualized dose optimization technique was used in order to meet ALARA standards for radiation dose reduction. In addition to vendor specific dose reduction algorithms, the dose reduction techniques vary based on the specific scanner utilized but frequently include automated exposure control, adjustment of the mA and/or kV according to patient size, and use of iterative reconstruction technique. IV Contrast: 100 mL Isovue-370 Oral Contrast: Yes. FINDINGS: LUNG BASES: Right lower lobe airspace consolidation. There is pleural effusions. Dom Mering LIVER: Mild diffuse steatosis. GALLBLADDER AND BILIARY TREE: No calcified gallstones. No gallbladder distention. No intra- or extrahepatic biliary dilatation. PANCREAS: Normal. SPLEEN: Tiny calcified granulomas. ADRENAL GLANDS: Mildly thickened adrenal glands. Dom Mering KIDNEYS AND URETERS: No hydronephrosis. No urolithiasis. Bilateral benign-appearing cysts. URINARY BLADDER: Normal. BOWEL: No dilatation. Sigmoid diverticulosis. LYMPH NODES: No abnormally enlarged nodes. PERITONEUM/RETROPERITONEUM: No ascites or free air. VESSELS: Aorta is normal caliber. ABDOMINAL WALL: No acute abnormality. BONES: No destructive process. . Degenerative changes spine. 1. No acute intra-abdominopelvic abnormality. 2. Sigmoid diverticulosis. 3. Right lower lobe airspace consolidation which may relate to pneumonia or aspiration. Treatments: As above.       Discharge Medications:     Medication List        CONTINUE taking these medications      acetaminophen 500 MG tablet  Commonly known as: TYLENOL     allopurinol 100 MG tablet  Commonly known as: ZYLOPRIM     atorvastatin 80 MG tablet  Commonly known as: LIPITOR     digoxin 125 MCG tablet  Commonly known as: LANOXIN     ferrous sulfate 325 (65 Fe) MG EC tablet  Commonly known as: FE TABS 325     lisinopril 40 MG tablet  Commonly known as: PRINIVIL;ZESTRIL  Take 1 tablet by mouth daily     metoprolol succinate 200 MG extended release tablet  Commonly known as: TOPROL XL  Take 1 tablet by mouth daily     pantoprazole 40 MG tablet  Commonly known as: PROTONIX  Take 1 tablet by mouth every morning (before breakfast)     spironolactone 25 MG tablet  Commonly known as: ALDACTONE     vitamin D 25 MCG (1000 UT) Tabs tablet  Commonly known as: CHOLECALCIFEROL            STOP taking these medications      apixaban 2.5 MG Tabs tablet  Commonly known as: ELIQUIS     aspirin 81 MG chewable tablet              Activity: activity as tolerated  Diet: ADULT DIET; Regular      Disposition: home  Discharged Condition: Stable  Follow Up:   No follow-up provider specified. Code status:  Full Code         Total time spent on discharge, finalizing medications, referrals and arranging outpatient follow up was more than 30 minutes      Thank you Dr. Wilfred Rayo for the opportunity to be involved in this patients care.

## 2023-03-13 NOTE — DISCHARGE INSTRUCTIONS
Extra Heart Failure sites:   https://HZO.[employer]/   --- this is American Heart Association interactive Healthier Living with Heart Failure guidebook. Please copy and paste link into search bar. Use your mouse to scroll through the pages. Lots and lots of info / tips    HF [place] ayush  --- free smart phone ayush available for [employer] and [employer]. Use your phone to track sodium / fluid intake,  symptoms, weight, etc.    [employer] - website-- [employer] is a dialysis company. All dialysis patients follow a renal diet which IS low sodium! This website offers free seasonal cookbooks.   Each quarter, they will release 25-30 new recipes with a breakdown of calories, sodium, glucose, etc    [email or web address]/recipes -- more free recipes

## 2023-03-13 NOTE — PROGRESS NOTES
Patient does not want procedures. Unclear cause of anemia so cannot comment on anticoagulation. Call back if he changes mind

## 2023-03-13 NOTE — PROGRESS NOTES
Patient alert and oriented x 4. VSS. Denies any pain. Hgb 7.8 at 0611 and Hgb 8.1 at 1143 today. Patient discussed treatment plan with Dr. Magda Sequeira. Discharged instructions provided. Patient and caregiver verbalized understanding. Patient wheeled to the front entrance where his spouse/caregiver able to ride him home.

## 2023-03-13 NOTE — PROGRESS NOTES
VSS, A&O, denies pain, n/v. Pt voiding using the urinal, no BM during this shift. Pt has been NPO. Call light used for needs. Uneventful night with pt.

## 2023-03-13 NOTE — CARE COORDINATION
12:55 PM  Met with patient and family at bedside. Introduces self and CM role. Patient was on the phone scheduling a follow up with cardiology, so CM spoke with family member in the room. CM offered home care services, to which family member declined. She stated that patient gets around independently at home and does not require and ambulation aides. CM VU and stated to call RN if and dc needs arose prior to leaving. Family member at patient agreed.      Electronically signed by Thi Meadows RN, ALVA on 3/13/2023 at 12:58 PM.  Phone: [phone]  Fax: [phone]

## 2023-03-14 NOTE — PROGRESS NOTES
Physician Progress Note      PATIENT:               Richard Cardoza  CSN #:                  [id number]  :                       1940  ADMIT DATE:       3/11/2023 12:55 PM  [address] DATE:        3/13/2023 4:05 PM  RESPONDING  PROVIDER #:        Jin Hernandez MD          QUERY TEXT:    Pt admitted with anemia and has CHF documented. If possible, please document   in progress notes and discharge summary further specificity regarding the type   and acuity of CHF:    The medical record reflects the following: Last ECHO at [place] [id number]   with EF: 55-60%  Risk Factors: 79 y.o. male with Anemia, CAD, CHF, A-fib  Clinical Indicators: Per GI consult, pt states new onset fatigue and dyspnea   on exertion  Treatment: Digoxin, Metoprolol, GI consult  Options provided:  -- Chronic Diastolic CHF/HFpEF  -- Other - I will add my own diagnosis  -- Disagree - Not applicable / Not valid  -- Disagree - Clinically unable to determine / Unknown  -- Refer to Clinical Documentation Reviewer    PROVIDER RESPONSE TEXT:    This patient has chronic diastolic CHF/HFpEF.     Query created by: Derenda Bloch on 3/13/2023 3:14 PM      Electronically signed by:  Jin Hernandez MD 3/14/2023 2:32 PM

## 2023-03-16 LAB — FOLATE SERPL-MCNC: 14.38 NG/ML (ref 4.78–24.2)

## 2025-06-19 NOTE — PROGRESS NOTES
Left Heart Catheterization    A left heart catheterization is a procedure that provides your cardiologist with detailed information regarding how your heart functions. A small catheter (long, fine tube) is inserted into an artery (a vessel that carries blood and oxygen) that leads to your heart. While watching with x-ray equipment, small amounts of dye are injected which enables visualization of the heart arteries and chambers. The pictures that your cardiologist receives from the cardiac catheterization enable him or her to decide on the best treatment for you. Date of the procedure:  10/1/20    Time of arrival: 0830    Cardiologist performing the procedure: Ching    Instructions for your left heart catheterization:    1. Bring a list of your medications to the hospital.    2.  Please notify us before the procedure if you are allergic to anything; especially x-ray contrast dye, iodine, nickel, or any type of jewelry. This is very important! 3. Do not eat or drink anything at all after midnight (or 8 hours) prior to the procedure. 4.  Take all morning medications EXCEPT any diuretics (water pills) the day of the procedure with a small sip of water. 5.  If you are on Coumadin, Warfarin, or [place], please notify us so that we can make adjustments to your medication. 6.  If you are taking Xarelto, Eliquis, or Pradaxa, please stop staking these medications two days prior to the procedure (including the day of the procedure). 7.  If you are diabetic, check your blood sugar in the morning. If your blood sugar is 120 or less, do not take insulin. If your blood sugar is more than 120, take half the dose of your normal insulin. Do not take Metformin the night before your procedure or morning of the procedure. 8.  You MUST have someone to drive you home--no driving for 24 hours after your procedure.   If an intervention is performed, you might stay overnight in the hospital.    9. [facility] Call Center    Phone Message    May a detailed message be left on voicemail: yes     Reason for Call: Other:Requesting a calling back from care team on previous conversation regarding medications. Please message back on [employer]. Thanks       Action Taken: Other: peds Weight Management     Travel Screening: Not Applicable     Date of Service:                                                                       Discharge instructions will be given to you at the time of your procedure. 10.  For any questions or if you cannot keep this appointment for any reason, please call [phone]. Spoke with pt regarding procedure. Pre-op instructions, time and location of arrival discussed. Pt will get COVID test 5-6 days prior to procedure. Pt verbalized understanding and all questions answered at this time.